# Patient Record
Sex: MALE | Race: WHITE | NOT HISPANIC OR LATINO | Employment: FULL TIME | URBAN - METROPOLITAN AREA
[De-identification: names, ages, dates, MRNs, and addresses within clinical notes are randomized per-mention and may not be internally consistent; named-entity substitution may affect disease eponyms.]

---

## 2021-03-22 ENCOUNTER — IMMUNIZATION (OUTPATIENT)
Dept: HEMATOLOGY/ONCOLOGY | Facility: CLINIC | Age: 41
End: 2021-03-22
Payer: COMMERCIAL

## 2021-03-22 DIAGNOSIS — Z23 NEED FOR VACCINATION: Primary | ICD-10-CM

## 2021-03-22 PROCEDURE — 0001A COVID-19, MRNA, LNP-S, PF, 30 MCG/0.3 ML DOSE VACCINE: ICD-10-PCS | Mod: CV19,S$GLB,, | Performed by: FAMILY MEDICINE

## 2021-03-22 PROCEDURE — 91300 COVID-19, MRNA, LNP-S, PF, 30 MCG/0.3 ML DOSE VACCINE: ICD-10-PCS | Mod: S$GLB,,, | Performed by: FAMILY MEDICINE

## 2021-03-22 PROCEDURE — 91300 COVID-19, MRNA, LNP-S, PF, 30 MCG/0.3 ML DOSE VACCINE: CPT | Mod: S$GLB,,, | Performed by: FAMILY MEDICINE

## 2021-03-22 PROCEDURE — 0001A COVID-19, MRNA, LNP-S, PF, 30 MCG/0.3 ML DOSE VACCINE: CPT | Mod: CV19,S$GLB,, | Performed by: FAMILY MEDICINE

## 2021-04-12 ENCOUNTER — IMMUNIZATION (OUTPATIENT)
Dept: HEMATOLOGY/ONCOLOGY | Facility: CLINIC | Age: 41
End: 2021-04-12
Payer: COMMERCIAL

## 2021-04-12 DIAGNOSIS — Z23 NEED FOR VACCINATION: Primary | ICD-10-CM

## 2021-04-12 PROCEDURE — 91300 COVID-19, MRNA, LNP-S, PF, 30 MCG/0.3 ML DOSE VACCINE: CPT | Mod: S$GLB,,, | Performed by: FAMILY MEDICINE

## 2021-04-12 PROCEDURE — 0002A COVID-19, MRNA, LNP-S, PF, 30 MCG/0.3 ML DOSE VACCINE: CPT | Mod: CV19,S$GLB,, | Performed by: FAMILY MEDICINE

## 2021-04-12 PROCEDURE — 91300 COVID-19, MRNA, LNP-S, PF, 30 MCG/0.3 ML DOSE VACCINE: ICD-10-PCS | Mod: S$GLB,,, | Performed by: FAMILY MEDICINE

## 2021-04-12 PROCEDURE — 0002A COVID-19, MRNA, LNP-S, PF, 30 MCG/0.3 ML DOSE VACCINE: ICD-10-PCS | Mod: CV19,S$GLB,, | Performed by: FAMILY MEDICINE

## 2021-06-29 ENCOUNTER — TELEPHONE (OUTPATIENT)
Dept: UROLOGY | Facility: CLINIC | Age: 41
End: 2021-06-29

## 2021-07-09 ENCOUNTER — OFFICE VISIT (OUTPATIENT)
Dept: UROLOGY | Facility: CLINIC | Age: 41
End: 2021-07-09
Payer: COMMERCIAL

## 2021-07-09 VITALS
RESPIRATION RATE: 18 BRPM | BODY MASS INDEX: 42.66 KG/M2 | WEIGHT: 315 LBS | SYSTOLIC BLOOD PRESSURE: 145 MMHG | HEIGHT: 72 IN | HEART RATE: 78 BPM | DIASTOLIC BLOOD PRESSURE: 84 MMHG

## 2021-07-09 DIAGNOSIS — Z30.2 STERILIZATION: Primary | ICD-10-CM

## 2021-07-09 PROCEDURE — 99203 PR OFFICE/OUTPT VISIT, NEW, LEVL III, 30-44 MIN: ICD-10-PCS | Mod: S$GLB,,, | Performed by: UROLOGY

## 2021-07-09 PROCEDURE — 99203 OFFICE O/P NEW LOW 30 MIN: CPT | Mod: S$GLB,,, | Performed by: UROLOGY

## 2021-07-09 RX ORDER — LOSARTAN POTASSIUM 50 MG/1
TABLET ORAL
COMMUNITY
Start: 2021-07-03 | End: 2023-03-29

## 2021-07-09 RX ORDER — ALLOPURINOL 300 MG/1
TABLET ORAL
COMMUNITY
Start: 2021-07-03 | End: 2023-07-19

## 2021-07-09 RX ORDER — SILDENAFIL 100 MG/1
100 TABLET, FILM COATED ORAL DAILY
COMMUNITY
Start: 2021-06-27 | End: 2023-04-17

## 2021-07-09 RX ORDER — METFORMIN HYDROCHLORIDE 500 MG/1
TABLET, EXTENDED RELEASE ORAL
COMMUNITY
Start: 2021-06-02 | End: 2023-03-29

## 2021-07-09 RX ORDER — HYDROXYZINE HYDROCHLORIDE 25 MG/1
TABLET, FILM COATED ORAL
COMMUNITY
Start: 2021-04-20 | End: 2023-08-08

## 2021-08-31 ENCOUNTER — TELEPHONE (OUTPATIENT)
Dept: UROLOGY | Facility: CLINIC | Age: 41
End: 2021-08-31

## 2021-09-07 ENCOUNTER — PROCEDURE VISIT (OUTPATIENT)
Dept: UROLOGY | Facility: CLINIC | Age: 41
End: 2021-09-07
Payer: COMMERCIAL

## 2021-09-07 VITALS
RESPIRATION RATE: 20 BRPM | OXYGEN SATURATION: 96 % | SYSTOLIC BLOOD PRESSURE: 116 MMHG | DIASTOLIC BLOOD PRESSURE: 73 MMHG | HEART RATE: 86 BPM | HEIGHT: 72 IN | BODY MASS INDEX: 39.85 KG/M2 | WEIGHT: 294.19 LBS

## 2021-09-07 DIAGNOSIS — Z30.2 STERILIZATION: Primary | ICD-10-CM

## 2021-09-07 PROCEDURE — 55250 REMOVAL OF SPERM DUCT(S): CPT | Mod: S$GLB,,, | Performed by: UROLOGY

## 2021-09-07 PROCEDURE — 55250 VASECTOMY: ICD-10-PCS | Mod: S$GLB,,, | Performed by: UROLOGY

## 2021-09-10 ENCOUNTER — TELEPHONE (OUTPATIENT)
Dept: UROLOGY | Facility: CLINIC | Age: 41
End: 2021-09-10

## 2021-09-15 ENCOUNTER — TELEPHONE (OUTPATIENT)
Dept: UROLOGY | Facility: CLINIC | Age: 41
End: 2021-09-15

## 2022-01-21 ENCOUNTER — TELEPHONE (OUTPATIENT)
Dept: UROLOGY | Facility: CLINIC | Age: 42
End: 2022-01-21
Payer: COMMERCIAL

## 2022-01-21 LAB
ABSTINENCE: 20 DAYS (ref 3–7)
Lab: 1026
MICROSCOPIC EXAM: ABNORMAL
RECEIVED WITHIN 1 HOUR: YES
SPERM SEEN: ABNORMAL SPERM/HPF
TIME DELIVERED: 1102
VOLUME: 3.5 ML

## 2022-01-21 NOTE — TELEPHONE ENCOUNTER
Informed pt of results.    ----- Message from Tobi Tapia sent at 1/21/2022  1:29 PM CST -----  Contact: Patient  Patient returning Debbie call       Please call the patient back at    # [phone]

## 2022-01-21 NOTE — TELEPHONE ENCOUNTER
Attempted to contact pt, no answer lvm for callback. BJP    ----- Message from Laly Pinedo NP sent at 1/21/2022 12:11 PM CST -----  Please call patient and let him know that he is now sterile

## 2022-04-09 ENCOUNTER — HISTORICAL (OUTPATIENT)
Dept: ADMINISTRATIVE | Facility: HOSPITAL | Age: 42
End: 2022-04-09

## 2022-04-14 LAB
BASOPHILS # BLD AUTO: 0.1 10*3/UL (ref 0–0.2)
BASOPHILS NFR BLD AUTO: 1 %
CHOLEST SERPL-MCNC: 149 MG/DL (ref 100–199)
CHOLEST/HDLC SERPL: 5.5 {RATIO} (ref 0–5)
DEPRECATED CALCIDIOL+CALCIFEROL SERPL-MC: 30.6 NG/ML (ref 30–100)
EOSINOPHIL # BLD AUTO: 0.3 10*3/UL (ref 0–0.4)
EOSINOPHIL NFR BLD AUTO: 4 %
ERYTHROCYTE [DISTWIDTH] IN BLOOD BY AUTOMATED COUNT: 13.7 % (ref 11.6–15.4)
HBA1C MFR BLD: 5.8 % (ref 4.8–5.6)
HCT VFR BLD AUTO: 47.1 % (ref 37.5–51)
HDLC SERPL-MCNC: 27 MG/DL
HGB BLD-MCNC: 15.9 G/DL (ref 13–17.7)
LDLC SERPL CALC-MCNC: 90 MG/DL (ref 0–99)
LYMPHOCYTES # BLD AUTO: 2.4 10*3/UL (ref 0.7–3.1)
LYMPHOCYTES NFR BLD AUTO: 32 %
MCH RBC QN AUTO: 28.8 PG (ref 26.6–33)
MCHC RBC AUTO-ENTMCNC: 33.8 G/DL (ref 31.5–35.7)
MCV RBC AUTO: 85 FL (ref 79–97)
MONOCYTES # BLD AUTO: 0.7 10*3/UL (ref 0.1–0.9)
MONOCYTES NFR BLD AUTO: 9 %
NEUTROPHILS # BLD AUTO: 4 10*3/UL (ref 1.4–7)
NEUTROPHILS NFR BLD AUTO: 54 %
PLATELET # BLD AUTO: 228 10*3/UL (ref 150–450)
RBC # BLD AUTO: 5.52 10*6/UL (ref 4.14–5.8)
TRIGL SERPL-MCNC: 187 MG/DL (ref 0–149)
TSH SERPL-ACNC: 1.6 M[IU]/L (ref 0.45–4.5)
URATE SERPL-MCNC: 5.4 MG/DL (ref 3.8–8.4)
VLDLC SERPL CALC-MCNC: 32 MG/DL (ref 5–40)
WBC # SPEC AUTO: 7.6 10*3/UL (ref 3.4–10.8)

## 2022-04-25 VITALS
BODY MASS INDEX: 39.36 KG/M2 | HEIGHT: 73 IN | DIASTOLIC BLOOD PRESSURE: 72 MMHG | OXYGEN SATURATION: 98 % | WEIGHT: 297 LBS | SYSTOLIC BLOOD PRESSURE: 120 MMHG

## 2022-05-16 ENCOUNTER — HISTORICAL (OUTPATIENT)
Dept: ADMINISTRATIVE | Facility: HOSPITAL | Age: 42
End: 2022-05-16

## 2022-05-21 NOTE — HISTORICAL [facility]ER
This is a historical note converted from [employer]. Formatting and pictures may have been removed.  Please reference [employer] for original formatting and attached multimedia. Chief Complaint  6 month f/u w/labs. C/O mid back pain x2 mon. w/no known injury.  History of Present Illness  The patient presents for follow-up with fasting labs and his blood pressure has been much better as long as he gets his medicines and takes it.? He has not had another gouty attack to need the colchicine but is taking the allopurinol regularly.? He is still tired on a regular basis has had sleep study?approximately 3 to 4 years ago?but was not able to afford the CPAP but now he has new insurance and would like to try?because he does have excessive sleep?during the day.? He is taking his cholesterol medication he does take Metformin?but he still finding that he is tired and feeling his medication is helping otherwise.  Review of Systems  Constitutional:Negative except HPI  Eye:No recent visual problems, no blurry vision, no visual disturbances.  ENT:No ear pain, no change in hearing, no nasal congestion, no sore throat  Respiratory:No shortness of breath, no cough, no wheezing.  Cardiovascular:No chest pain, no palpitations, no peripheral edema.  Gastrointestinal:No nausea, no vomiting, no diarrhea, no constipation, no abdominal pain.  Genitourinary:No dysuria, no hematuria.?_?  Immunologic:No recurrent fevers, no malaise  Hematology/lymphatics:No swollen lymph glands.  Musculoskeletal:Negative except HPI.  Integumentary:No rashes, no skin lesions.  Neurologic:Alert, oriented ?4, no abnormal balance, no confusion, no numbness, no tingling, no headache  Psychiatric:Negative except HPI.?  Physical Exam  Vitals & Measurements  T:?36.4? ?C (Temporal Artery)? HR:?70(Peripheral)? RR:?18? BP:?122/64? SpO2:?99%?  HT:?185.45?cm? WT:?140.910?kg? BMI:?40.97?  General:?Alert and oriented, no acute distress.  Eye:?Pupils are equal, round?and reactive to  light, extraocular movements are intact  HENT:?Normocephalic,?TMs clear, moist oral mucosa, no pharyngeal erythema, no sinus tenderness.??  Neck: Supple, nontender, no lymphadenopathy, no thyromegaly??  Respiratory:?Lungs are clear to auscultation, breath sounds are equal and symmetric  Cardiovascular:?Normal rate, regular rhythm, no murmurs, no gallop, no edema  Gastrointestinal:?Soft, nontender, nondistended, normal bowel sounds, no organomegaly.  Genitourinary: No CVA tenderness??  Musculoskeletal:Normal range of motion, no swelling,?normal gait.?  Integumentary:?Warm, dry, pink, no rash.?  Neurologic:?Alert, oriented, no focal deficits,?normal DTRs??  Psychiatric:?Cooperative, appropriate mood and affect, normal judgment,?nonsuicidal??  Assessment/Plan  1.?Hyperlipidemia ? E78.5  2.?Benign hypertension ? I10  3.?BMI 40.0-44.9, adult ? Z68.41  4.?History of gout ? Z87.39  5.?Nicotine dependence ? F17.200  6.?Prediabetes ? R73.03  7.?Sleep apnea ? G47.30  Orders:  ezetimibe, See Instructions, TAKE 1 TABLET BY MOUTH EVERY DAY IN THE EVENING, # 90 tab(s), 1 Refill(s), [id number], 185.45, cm, Height/Length Dosing, 04/13/22 8:25:00 CDT, 140.91, kg, Weight Dosing, 04/13/22 8:25:00 CDT  losartan, See Instructions, TAKE 1 TABLET BY MOUTH EVERY DAY, # 30 tab(s), 2 Refill(s), Pharmacy: [facility] #[id number], 185.45, cm, Height/Length Dosing, 04/13/22 8:25:00 CDT, 140.91, kg, Weight Dosing, 04/13/22 8:25:00 CDT  metFORMIN, See Instructions, TAKE 2 TABLETS BY MOUTH IN THE EVENING, # 60 tab(s), 2 Refill(s), [id number], 185.45, cm, Height/Length Dosing, 04/13/22 8:25:00 CDT, 140.91, kg, Weight Dosing, 04/13/22 8:25:00 CDT  semaglutide, 0.25 mg =, Subcutaneous, qWeek, rotate injection sites begin with 0.25 mg and increase to 0.5 for blood sugar control, # 1 EA, 2 Refill(s), [facility], 185.45, cm, Height/Length Dosing, 04/13/22 8:25:00 CDT, 140.91, kg,  James...  tiZANidine, See Instructions, TAKE 1 TABLET BY MOUTH EVERY NIGHT AT BEDTIME, # 21 tab(s), 0 Refill(s), Pharmacy: [facility]/pharmacy #[id number], 185.45, cm, Height/Length Dosing, 04/13/22 8:25:00 CDT, 140.91, kg, Weight Dosing, 04/13/22 8:25:00 CDT  CMP, FLP, Uric acid, Hgb a1c,  Referrals  Clinic Follow up, *Est. 10/13/22 3:00:00 CDT, Order for future visit, Hyperlipidemia  Benign hypertension  BMI 40.0-44.9, adult  History of gout  Nicotine dependence  Prediabetes  Sleep apnea, [facility]  Clinic Follow up, *Est. 05/25/22 3:00:00 CDT, Order for future visit, Hyperlipidemia  Benign hypertension  BMI 40.0-44.9, adult  History of gout  Nicotine dependence  Prediabetes  Sleep apnea, [facility]   Problem List/Past Medical History  Ongoing  Benign hypertension  BMI 40.0-44.9, adult  History of gout  Hyperlipidemia  Nicotine dependence  Prediabetes  Sleep apnea  Historical  No qualifying data  Procedure/Surgical History  vasectomy (09/07/2021)  knee arthroscopy/surgery   Medications  allopurinol 300 mg oral tablet, See Instructions  colchicine 0.6 mg oral tablet, 0.6 mg= 1 tab(s), Oral, q1hr, PRN  ezetimibe 10 mg oral tablet, See Instructions, 1 refills  losartan 50 mg oral tablet, See Instructions, 2 refills  MetFORMIN (Eqv-Glucophage XR) 500 mg oral tablet, extended release, See Instructions, 2 refills  Ozempic 2 mg/1.5 mL (0.25 mg or 0.5 mg dose) subcutaneous solution, 0.25 mg, Subcutaneous, qWeek, 2 refills  sildenafil 100 mg oral tablet, 100 mg= 1 tab(s), Oral, Daily, 1 refills  tiZANidine 4 mg oral tablet, See Instructions  Allergies  No Known Allergies  No Known Medication Allergies  Social History  Abuse/Neglect  No, No, Yes, 04/13/2022  Alcohol  Past, 03/18/2020  Employment/School  Employed, 09/09/2021  Home/Environment  Living situation: Home/Independent., 09/09/2021  Nutrition/Health  Regular, Good, 09/09/2021  Substance Use  Never,  03/18/2020  Tobacco  Former smoker, quit more than 30 days ago, Oral, N/A, 04/13/2022  Family History  Family history is negative  Immunizations  Vaccine Date Status Comments   COVID-19 MRNA, LNP-S, PF- Pfizer 04/12/2021 Recorded 2021-06-16: VIS DATE: 12/12/2020   COVID-19 MRNA, LNP-S, PF- Pfizer 03/22/2021 Recorded 2021-06-16: VIS DATE: 12/12/2020   influenza virus vaccine, inactivated 01/21/2021 Given    tetanus/diphtheria/pertussis, acel(Tdap) 09/19/2018 Recorded    influenza virus vaccine, inactivated 09/19/2018 Recorded    Health Maintenance  Health Maintenance  ???Pending?(in the next year)  ???There are no current recommendations pending  ??? ??Due In Future?  ??? ? ? ?Diabetes Screening not due until??09/02/22??and every 1??year(s)  ??? ? ? ?Hypertension Management-BMP not due until??09/02/22??and every 1??year(s)  ??? ? ? ?ADL Screening not due until??09/09/22??and every 1??year(s)  ??? ? ? ?Obesity Screening not due until??01/01/23??and every 1??year(s)  ??? ? ? ?Alcohol Misuse Screening not due until??01/02/23??and every 1??year(s)  ???Satisfied?(in the past 1 year)  ??? ??Satisfied?  ??? ? ? ?ADL Screening on??09/09/21.??Satisfied by Huong Nevarez LPN  ??? ? ? ?Alcohol Misuse Screening on??04/13/22.??Satisfied by Huong Nevarez LPN  ??? ? ? ?Blood Pressure Screening on??04/13/22.??Satisfied by Huong Nevarez LPN  ??? ? ? ?Body Mass Index Check on??04/13/22.??Satisfied by Huong Nevarez LPN  ??? ? ? ?Depression Screening on??04/13/22.??Satisfied by Huong Nevarez LPN  ??? ? ? ?Diabetes Maintenance-HgbA1c on??09/02/21.??Satisfied by [facility], [facility]  ??? ? ? ?Diabetes Screening on??09/02/21.??Satisfied by [facility]ributor[facility]system, [facility]  ??? ? ? ?Hypertension Management-Blood Pressure on??04/13/22.??Satisfied by Huong Nevarez LPN  ??? ? ? ?Lipid Screening on??09/02/21.??Satisfied by [facility], [facility]  ??? ? ? ?Obesity Screening  on??04/13/22.??Satisfied by Huong Nevarez LPN  ?

## 2022-08-02 LAB — HBA1C MFR BLD: 5.5 % (ref 4–6)

## 2023-02-01 ENCOUNTER — TELEPHONE (OUTPATIENT)
Dept: FAMILY MEDICINE | Facility: CLINIC | Age: 43
End: 2023-02-01
Payer: COMMERCIAL

## 2023-02-01 DIAGNOSIS — M62.838 MUSCLE SPASM: Primary | ICD-10-CM

## 2023-02-01 RX ORDER — TIZANIDINE 4 MG/1
1 TABLET ORAL NIGHTLY PRN
Refills: 1 | COMMUNITY
Start: 2023-01-04 | End: 2023-02-01 | Stop reason: SDUPTHER

## 2023-02-01 RX ORDER — TIZANIDINE 4 MG/1
4 TABLET ORAL NIGHTLY PRN
Qty: 21 TABLET | Refills: 1 | Status: SHIPPED | OUTPATIENT
Start: 2023-02-01 | End: 2023-03-20

## 2023-02-01 NOTE — TELEPHONE ENCOUNTER
----- Message from Shellie Metz sent at 1/31/2023  1:18 PM CST -----  Patient needs refill on Tizanidine 4 mg [facility] on [address]

## 2023-02-15 PROBLEM — F17.200 NICOTINE DEPENDENCE: Status: ACTIVE | Noted: 2023-02-15

## 2023-02-15 PROBLEM — R73.03 PREDIABETES: Status: ACTIVE | Noted: 2023-02-15

## 2023-02-15 PROBLEM — I10 BENIGN HYPERTENSION: Status: ACTIVE | Noted: 2023-02-15

## 2023-02-15 PROBLEM — G47.30 SLEEP APNEA: Status: ACTIVE | Noted: 2023-02-15

## 2023-02-15 PROBLEM — E78.5 HYPERLIPIDEMIA: Status: ACTIVE | Noted: 2023-02-15

## 2023-02-15 PROBLEM — Z87.39 HISTORY OF GOUT: Status: ACTIVE | Noted: 2023-02-15

## 2023-04-27 DIAGNOSIS — M62.838 MUSCLE SPASM: ICD-10-CM

## 2023-04-27 RX ORDER — TIZANIDINE 4 MG/1
TABLET ORAL
Qty: 21 TABLET | Refills: 0 | Status: SHIPPED | OUTPATIENT
Start: 2023-04-27 | End: 2023-05-10

## 2023-05-10 DIAGNOSIS — M62.838 MUSCLE SPASM: ICD-10-CM

## 2023-05-10 RX ORDER — TIZANIDINE 4 MG/1
TABLET ORAL
Qty: 21 TABLET | Refills: 0 | Status: SHIPPED | OUTPATIENT
Start: 2023-05-10 | End: 2023-06-01

## 2023-06-20 DIAGNOSIS — I10 HYPERTENSION, UNSPECIFIED TYPE: ICD-10-CM

## 2023-06-20 DIAGNOSIS — M62.838 MUSCLE SPASM: ICD-10-CM

## 2023-06-20 DIAGNOSIS — E11.9 TYPE 2 DIABETES MELLITUS WITHOUT COMPLICATION, UNSPECIFIED WHETHER LONG TERM INSULIN USE: ICD-10-CM

## 2023-06-20 DIAGNOSIS — E78.5 HYPERLIPIDEMIA, UNSPECIFIED HYPERLIPIDEMIA TYPE: Primary | ICD-10-CM

## 2023-06-20 RX ORDER — LOSARTAN POTASSIUM 50 MG/1
TABLET ORAL
Qty: 30 TABLET | Refills: 2 | Status: SHIPPED | OUTPATIENT
Start: 2023-06-20 | End: 2023-08-08 | Stop reason: SDUPTHER

## 2023-06-20 RX ORDER — EZETIMIBE 10 MG/1
TABLET ORAL
Qty: 30 TABLET | Refills: 5 | Status: SHIPPED | OUTPATIENT
Start: 2023-06-20 | End: 2023-08-08 | Stop reason: SDUPTHER

## 2023-06-20 RX ORDER — METFORMIN HYDROCHLORIDE 500 MG/1
TABLET, EXTENDED RELEASE ORAL
Qty: 60 TABLET | Refills: 2 | Status: SHIPPED | OUTPATIENT
Start: 2023-06-20 | End: 2023-08-08 | Stop reason: SDUPTHER

## 2023-06-20 RX ORDER — TIZANIDINE 4 MG/1
TABLET ORAL
Qty: 21 TABLET | Refills: 0 | Status: SHIPPED | OUTPATIENT
Start: 2023-06-20 | End: 2023-07-10 | Stop reason: SDUPTHER

## 2023-07-06 DIAGNOSIS — R73.03 PREDIABETES: Primary | ICD-10-CM

## 2023-07-10 DIAGNOSIS — M62.838 MUSCLE SPASM: ICD-10-CM

## 2023-07-10 RX ORDER — SEMAGLUTIDE 1.34 MG/ML
INJECTION, SOLUTION SUBCUTANEOUS
Qty: 3 EACH | Refills: 3 | Status: SHIPPED | OUTPATIENT
Start: 2023-07-10 | End: 2023-08-08

## 2023-07-11 RX ORDER — TIZANIDINE 4 MG/1
4 TABLET ORAL DAILY PRN
Qty: 21 TABLET | Refills: 0 | Status: SHIPPED | OUTPATIENT
Start: 2023-07-11 | End: 2023-07-31

## 2023-07-19 DIAGNOSIS — Z87.39 HISTORY OF GOUT: Primary | ICD-10-CM

## 2023-07-19 RX ORDER — ALLOPURINOL 300 MG/1
TABLET ORAL
Qty: 30 TABLET | Refills: 5 | Status: SHIPPED | OUTPATIENT
Start: 2023-07-19 | End: 2024-01-22

## 2023-07-29 DIAGNOSIS — M62.838 MUSCLE SPASM: ICD-10-CM

## 2023-07-31 RX ORDER — TIZANIDINE 4 MG/1
TABLET ORAL
Qty: 21 TABLET | Refills: 0 | Status: SHIPPED | OUTPATIENT
Start: 2023-07-31 | End: 2023-08-08 | Stop reason: SDUPTHER

## 2023-08-02 ENCOUNTER — DOCUMENTATION ONLY (OUTPATIENT)
Dept: FAMILY MEDICINE | Facility: CLINIC | Age: 43
End: 2023-08-02
Payer: COMMERCIAL

## 2023-08-08 ENCOUNTER — OFFICE VISIT (OUTPATIENT)
Dept: FAMILY MEDICINE | Facility: CLINIC | Age: 43
End: 2023-08-08
Payer: COMMERCIAL

## 2023-08-08 VITALS
DIASTOLIC BLOOD PRESSURE: 78 MMHG | WEIGHT: 294 LBS | TEMPERATURE: 97 F | RESPIRATION RATE: 18 BRPM | SYSTOLIC BLOOD PRESSURE: 132 MMHG | BODY MASS INDEX: 38.97 KG/M2 | HEIGHT: 73 IN | OXYGEN SATURATION: 100 % | HEART RATE: 73 BPM

## 2023-08-08 DIAGNOSIS — Z87.39 HISTORY OF GOUT: ICD-10-CM

## 2023-08-08 DIAGNOSIS — I10 HYPERTENSION, UNSPECIFIED TYPE: ICD-10-CM

## 2023-08-08 DIAGNOSIS — E11.9 TYPE 2 DIABETES MELLITUS WITHOUT COMPLICATION, WITHOUT LONG-TERM CURRENT USE OF INSULIN: ICD-10-CM

## 2023-08-08 DIAGNOSIS — E11.9 TYPE 2 DIABETES MELLITUS WITHOUT COMPLICATION, UNSPECIFIED WHETHER LONG TERM INSULIN USE: ICD-10-CM

## 2023-08-08 DIAGNOSIS — G47.30 SLEEP APNEA, UNSPECIFIED TYPE: ICD-10-CM

## 2023-08-08 DIAGNOSIS — N52.9 ERECTILE DYSFUNCTION, UNSPECIFIED ERECTILE DYSFUNCTION TYPE: ICD-10-CM

## 2023-08-08 DIAGNOSIS — M62.838 MUSCLE SPASM: ICD-10-CM

## 2023-08-08 DIAGNOSIS — I10 BENIGN HYPERTENSION: Primary | ICD-10-CM

## 2023-08-08 DIAGNOSIS — E78.5 HYPERLIPIDEMIA, UNSPECIFIED HYPERLIPIDEMIA TYPE: ICD-10-CM

## 2023-08-08 PROCEDURE — 99214 OFFICE O/P EST MOD 30 MIN: CPT | Mod: ,,, | Performed by: NURSE PRACTITIONER

## 2023-08-08 PROCEDURE — 4010F ACE/ARB THERAPY RXD/TAKEN: CPT | Mod: CPTII,,, | Performed by: NURSE PRACTITIONER

## 2023-08-08 PROCEDURE — 3008F BODY MASS INDEX DOCD: CPT | Mod: CPTII,,, | Performed by: NURSE PRACTITIONER

## 2023-08-08 PROCEDURE — 1159F PR MEDICATION LIST DOCUMENTED IN MEDICAL RECORD: ICD-10-PCS | Mod: CPTII,,, | Performed by: NURSE PRACTITIONER

## 2023-08-08 PROCEDURE — 3075F SYST BP GE 130 - 139MM HG: CPT | Mod: CPTII,,, | Performed by: NURSE PRACTITIONER

## 2023-08-08 PROCEDURE — 3078F PR MOST RECENT DIASTOLIC BLOOD PRESSURE < 80 MM HG: ICD-10-PCS | Mod: CPTII,,, | Performed by: NURSE PRACTITIONER

## 2023-08-08 PROCEDURE — 4010F PR ACE/ARB THEARPY RXD/TAKEN: ICD-10-PCS | Mod: CPTII,,, | Performed by: NURSE PRACTITIONER

## 2023-08-08 PROCEDURE — 3078F DIAST BP <80 MM HG: CPT | Mod: CPTII,,, | Performed by: NURSE PRACTITIONER

## 2023-08-08 PROCEDURE — 1160F RVW MEDS BY RX/DR IN RCRD: CPT | Mod: CPTII,,, | Performed by: NURSE PRACTITIONER

## 2023-08-08 PROCEDURE — 3075F PR MOST RECENT SYSTOLIC BLOOD PRESS GE 130-139MM HG: ICD-10-PCS | Mod: CPTII,,, | Performed by: NURSE PRACTITIONER

## 2023-08-08 PROCEDURE — 1159F MED LIST DOCD IN RCRD: CPT | Mod: CPTII,,, | Performed by: NURSE PRACTITIONER

## 2023-08-08 PROCEDURE — 1160F PR REVIEW ALL MEDS BY PRESCRIBER/CLIN PHARMACIST DOCUMENTED: ICD-10-PCS | Mod: CPTII,,, | Performed by: NURSE PRACTITIONER

## 2023-08-08 PROCEDURE — 99214 PR OFFICE/OUTPT VISIT, EST, LEVL IV, 30-39 MIN: ICD-10-PCS | Mod: ,,, | Performed by: NURSE PRACTITIONER

## 2023-08-08 PROCEDURE — 3008F PR BODY MASS INDEX (BMI) DOCUMENTED: ICD-10-PCS | Mod: CPTII,,, | Performed by: NURSE PRACTITIONER

## 2023-08-08 RX ORDER — EZETIMIBE 10 MG/1
10 TABLET ORAL NIGHTLY
Qty: 90 TABLET | Refills: 0 | Status: SHIPPED | OUTPATIENT
Start: 2023-08-08 | End: 2023-11-13

## 2023-08-08 RX ORDER — SEMAGLUTIDE 2.68 MG/ML
2 INJECTION, SOLUTION SUBCUTANEOUS
Qty: 3 ML | Refills: 2 | Status: SHIPPED | OUTPATIENT
Start: 2023-08-08 | End: 2023-11-06

## 2023-08-08 RX ORDER — SILDENAFIL 100 MG/1
100 TABLET, FILM COATED ORAL
Qty: 90 TABLET | Refills: 0 | Status: SHIPPED | OUTPATIENT
Start: 2023-08-08 | End: 2023-09-26 | Stop reason: ALTCHOICE

## 2023-08-08 RX ORDER — TIZANIDINE 4 MG/1
4 TABLET ORAL DAILY
Qty: 30 TABLET | Refills: 0 | Status: SHIPPED | OUTPATIENT
Start: 2023-08-08 | End: 2023-08-17

## 2023-08-08 RX ORDER — LOSARTAN POTASSIUM 50 MG/1
50 TABLET ORAL DAILY
Qty: 90 TABLET | Refills: 0 | Status: SHIPPED | OUTPATIENT
Start: 2023-08-08 | End: 2023-11-13

## 2023-08-08 RX ORDER — METFORMIN HYDROCHLORIDE 500 MG/1
1000 TABLET, EXTENDED RELEASE ORAL 2 TIMES DAILY WITH MEALS
Qty: 360 TABLET | Refills: 0 | Status: SHIPPED | OUTPATIENT
Start: 2023-08-08 | End: 2023-10-09

## 2023-08-08 NOTE — ASSESSMENT & PLAN NOTE
The 10-year ASCVD risk score (Aroldo TEAGUE, et al., 2019) is: 2.6%*    Values used to calculate the score:      Age: 43 years      Sex: Male      Is Non- : No      Diabetic: No      Tobacco smoker: No      Systolic Blood Pressure: 132 mmHg      Is BP treated: Yes      HDL Cholesterol: 27 mg/dL*      Total Cholesterol: 149 mg/dL*      * - Cholesterol units were assumed for this score calculation

## 2023-08-08 NOTE — PROGRESS NOTES
Subjective:       Patient ID: Jose Valadez is a 43 y.o. male.    Chief Complaint: medication f/u (Would like to increase ozempic dose)    He is here today for follow up chronic conditions. Tolerating ozempic without nausea, vomiting or GI upset. He is down 3 pounds from previous visit. He is unsure when diabetes diagnosis but a1c stable for past couple of year. He denies home blood sugar monitoring. He denies home blood pressure readings. He lillian chest pain, shortness of breath, or any other cardiac issues. He did have sleep study done couple of years that reveal sleep apnea but machine too expensive. He would like to either try to reorder machine or retest. He does snore at night. He is with day time fatigue especially worsen in past couple of months. He is with constipation and diarrhea that alternates. He did have some abnormal stool that he describes looking sphagetti sauce. He states 3 or 4 times. He denies rectal pain. He winchester shave occasional llq and rlq pain. He denies any fever or any other issues. He is unsure if this is a change in bowel patterns. He denies colonoscopy at this time. He would like to to have testosterone checked due for fatigue and decrease labido. He does finds at time urine stream strong and some times no so strong. He does find when urine stream with difficulty starting also difficulty getting erections. He denies prostate exam at this time but will consider urologist if testosterone and PSA wnl.       Review of Systems   Constitutional:  Positive for fatigue. Negative for activity change, appetite change, chills and fever.   HENT:  Negative for ear pain, sinus pressure/congestion, sore throat, tinnitus and trouble swallowing.    Eyes:  Negative for pain, discharge and eye dryness.   Respiratory:  Negative for apnea, cough, chest tightness and shortness of breath.    Cardiovascular:  Negative for chest pain.   Gastrointestinal:  Positive for abdominal pain, constipation and diarrhea.  Negative for abdominal distention.   Endocrine: Negative for cold intolerance and heat intolerance.   Genitourinary:  Positive for difficulty urinating and erectile dysfunction. Negative for bladder incontinence.   Musculoskeletal:  Negative for arthralgias, back pain and gait problem.   Integumentary:  Negative.   Allergic/Immunologic: Negative for environmental allergies, food allergies and frequent infections.   Neurological:  Negative for dizziness, headaches, coordination difficulties, memory loss and coordination difficulties.   Psychiatric/Behavioral:  Negative for sleep disturbance and suicidal ideas. The patient is not nervous/anxious.          Objective:      Physical Exam  Vitals and nursing note reviewed.   Constitutional:       Appearance: Normal appearance. He is obese.   HENT:      Head: Normocephalic and atraumatic.      Right Ear: Tympanic membrane, ear canal and external ear normal.      Left Ear: Tympanic membrane, ear canal and external ear normal.      Nose: Nose normal.      Mouth/Throat:      Mouth: Mucous membranes are moist.      Pharynx: Oropharynx is clear.   Eyes:      Extraocular Movements: Extraocular movements intact.      Conjunctiva/sclera: Conjunctivae normal.      Pupils: Pupils are equal, round, and reactive to light.   Cardiovascular:      Rate and Rhythm: Normal rate and regular rhythm.      Pulses: Normal pulses.      Heart sounds: Normal heart sounds.   Pulmonary:      Effort: Pulmonary effort is normal.      Breath sounds: Normal breath sounds.   Abdominal:      General: Abdomen is flat. Bowel sounds are normal.      Palpations: Abdomen is soft.      Comments: No significant tenderness to abdomen   Musculoskeletal:         General: Normal range of motion.      Cervical back: Normal range of motion.   Skin:     General: Skin is warm and dry.      Capillary Refill: Capillary refill takes less than 2 seconds.   Neurological:      General: No focal deficit present.      Mental  Status: He is alert and oriented to person, place, and time.   Psychiatric:         Attention and Perception: Attention and perception normal.         Mood and Affect: Mood and affect normal.         Speech: Speech normal.         Behavior: Behavior normal. Behavior is cooperative.         Thought Content: Thought content normal.         Cognition and Memory: Cognition normal.         Assessment/Plan:     Vitals:    08/08/23 0841   BP: 132/78   Pulse: 73   Resp: 18   Temp: 96.8 °F (36 °C)        1. Benign hypertension  Assessment & Plan:  The current medical regimen is effective;  continue present plan and medications.  Denies home blood pressure readings       2. Type 2 diabetes mellitus without complication, without long-term current use of insulin  Assessment & Plan:  Increase ozempic to 2mg     Orders:  -     semaglutide (OZEMPIC) 2 mg/dose (8 mg/3 mL) PnIj; Inject 2 mg into the skin every 7 days.  Dispense: 3 mL; Refill: 2    3. Hyperlipidemia, unspecified hyperlipidemia type  Assessment & Plan:  The 10-year ASCVD risk score (Aroldo TEAGUE, et al., 2019) is: 2.6%*    Values used to calculate the score:      Age: 43 years      Sex: Male      Is Non- : No      Diabetic: No      Tobacco smoker: No      Systolic Blood Pressure: 132 mmHg      Is BP treated: Yes      HDL Cholesterol: 27 mg/dL*      Total Cholesterol: 149 mg/dL*      * - Cholesterol units were assumed for this score calculation      Orders:  -     ezetimibe (ZETIA) 10 mg tablet; Take 1 tablet (10 mg total) by mouth every evening.  Dispense: 90 tablet; Refill: 0    4. History of gout  Assessment & Plan:  No current episodes       5. Sleep apnea, unspecified type  Assessment & Plan:  Last sleep study about 2 years but cpap machine too expensive   Reorder sleep study       6. Erectile dysfunction, unspecified erectile dysfunction type  -     sildenafiL (VIAGRA) 100 MG tablet; Take 1 tablet (100 mg total) by mouth as needed for  Erectile Dysfunction.  Dispense: 90 tablet; Refill: 0    7. Hypertension, unspecified type  -     losartan (COZAAR) 50 MG tablet; Take 1 tablet (50 mg total) by mouth once daily.  Dispense: 90 tablet; Refill: 0    8. Type 2 diabetes mellitus without complication, unspecified whether long term insulin use  Assessment & Plan:  Increase ozempic to 2mg     Orders:  -     metFORMIN (GLUCOPHAGE-XR) 500 MG ER 24hr tablet; Take 2 tablets (1,000 mg total) by mouth 2 (two) times daily with meals.  Dispense: 360 tablet; Refill: 0    9. Muscle spasm  -     tiZANidine (ZANAFLEX) 4 MG tablet; Take 1 tablet (4 mg total) by mouth once daily.  Dispense: 30 tablet; Refill: 0     Educated on home blood pressure reading and home glucose monitoring. Send order for cpap machine with new insurance or reorder sleep study if needed. Consider GI referral if bowel patterns continue or any sign of blood to notify. Diet modifications, low fat, low carb, heart healthy, diet.       Follow up in about 6 months (around 2/8/2024) for Clinic Follow Up.   Discussed the diagnosis, prognosis, plan of care, chronic nature of and indications for, risks and benefits of treatment for conditions.  Continue all medications as prescribed unless otherwise noted.   Call if patient develops other symptoms or if not better in 2-3 days and sooner if worse. Emphasized the importance of compliance with all recommendations, including medication use and timely follow up. Instructed to return as noted be or sooner if patient develops any other problems or if there are any other additional questions or concerns.

## 2023-08-08 NOTE — ASSESSMENT & PLAN NOTE
The current medical regimen is effective;  continue present plan and medications.  Denies home blood pressure readings

## 2023-08-09 ENCOUNTER — TELEPHONE (OUTPATIENT)
Dept: FAMILY MEDICINE | Facility: CLINIC | Age: 43
End: 2023-08-09
Payer: COMMERCIAL

## 2023-08-09 NOTE — TELEPHONE ENCOUNTER
----- Message from Shellie Metz sent at 8/9/2023 12:58 PM CDT -----  Patient saw his labs on [facility] and wants to speak with someone about them

## 2023-08-09 NOTE — TELEPHONE ENCOUNTER
----- Message from Gladys Chavez DNP sent at 8/9/2023 12:35 PM CDT -----  Notify slightly elevated alkaline phos, but other liver enzymes or normal.  LDL currently at 81 goal for diabetes is less than 70 but this is a great improvement from where it was.  Continue efforts to eat healthy increase.  PSA normal uric acid therapeutic.  Hemoglobin A1c excellent continue current medications recheck CMP FLP uric acid cbc,hgb a1c in 6 months, normal testosterone.    Statement Selected

## 2023-08-17 DIAGNOSIS — M62.838 MUSCLE SPASM: ICD-10-CM

## 2023-08-17 RX ORDER — TIZANIDINE 4 MG/1
4 TABLET ORAL
Qty: 21 TABLET | Refills: 0 | Status: SHIPPED | OUTPATIENT
Start: 2023-08-17 | End: 2023-10-25 | Stop reason: SDUPTHER

## 2023-09-20 ENCOUNTER — TELEPHONE (OUTPATIENT)
Dept: FAMILY MEDICINE | Facility: CLINIC | Age: 43
End: 2023-09-20
Payer: COMMERCIAL

## 2023-09-20 DIAGNOSIS — N52.9 ERECTILE DYSFUNCTION, UNSPECIFIED ERECTILE DYSFUNCTION TYPE: Primary | ICD-10-CM

## 2023-09-20 RX ORDER — TADALAFIL 5 MG/1
5 TABLET ORAL DAILY
Qty: 30 TABLET | Refills: 0 | Status: SHIPPED | OUTPATIENT
Start: 2023-09-20 | End: 2023-10-20

## 2023-09-20 NOTE — TELEPHONE ENCOUNTER
----- Message from Shellie Metz sent at 9/20/2023  1:32 PM CDT -----  Patient called to speak with Gladys or her nurse.  He wants to try a new medication similar to the one he currently takes.  Note:  Patient would not give more details on medication.  Please call patient at [phone]

## 2023-09-26 ENCOUNTER — TELEPHONE (OUTPATIENT)
Dept: FAMILY MEDICINE | Facility: CLINIC | Age: 43
End: 2023-09-26
Payer: COMMERCIAL

## 2023-09-26 DIAGNOSIS — N52.9 ERECTILE DYSFUNCTION, UNSPECIFIED ERECTILE DYSFUNCTION TYPE: Primary | ICD-10-CM

## 2023-09-26 RX ORDER — TADALAFIL 10 MG/1
10 TABLET ORAL DAILY PRN
COMMUNITY
End: 2023-09-26 | Stop reason: SDUPTHER

## 2023-09-26 RX ORDER — TADALAFIL 10 MG/1
10 TABLET ORAL DAILY PRN
Qty: 30 TABLET | Refills: 0 | Status: SHIPPED | OUTPATIENT
Start: 2023-09-26 | End: 2023-11-27 | Stop reason: SDUPTHER

## 2023-09-26 NOTE — TELEPHONE ENCOUNTER
[facility] pharmacy called-unable to get cialis 5mg, wanting to know if they could fill the cialis 10mg daily?

## 2023-10-08 DIAGNOSIS — E11.9 TYPE 2 DIABETES MELLITUS WITHOUT COMPLICATION, UNSPECIFIED WHETHER LONG TERM INSULIN USE: ICD-10-CM

## 2023-10-09 RX ORDER — METFORMIN HYDROCHLORIDE 500 MG/1
1000 TABLET, EXTENDED RELEASE ORAL
Qty: 60 TABLET | Refills: 2 | Status: SHIPPED | OUTPATIENT
Start: 2023-10-09 | End: 2023-11-22

## 2023-10-25 ENCOUNTER — TELEPHONE (OUTPATIENT)
Dept: FAMILY MEDICINE | Facility: CLINIC | Age: 43
End: 2023-10-25
Payer: COMMERCIAL

## 2023-10-25 DIAGNOSIS — M62.838 MUSCLE SPASM: ICD-10-CM

## 2023-10-25 RX ORDER — TIZANIDINE 4 MG/1
4 TABLET ORAL DAILY PRN
Qty: 21 TABLET | Refills: 0 | Status: SHIPPED | OUTPATIENT
Start: 2023-10-25 | End: 2023-11-27 | Stop reason: SDUPTHER

## 2023-10-25 NOTE — TELEPHONE ENCOUNTER
----- Message from Clementina Schumacher sent at 10/25/2023  2:43 PM CDT -----  Regarding: Med refill  He needs a refill on his Tizanidine  4mg    [facility] [address] [facility] [place]

## 2023-11-12 DIAGNOSIS — I10 HYPERTENSION, UNSPECIFIED TYPE: ICD-10-CM

## 2023-11-12 DIAGNOSIS — E78.5 HYPERLIPIDEMIA, UNSPECIFIED HYPERLIPIDEMIA TYPE: ICD-10-CM

## 2023-11-13 RX ORDER — LOSARTAN POTASSIUM 50 MG/1
50 TABLET ORAL
Qty: 30 TABLET | Refills: 2 | Status: SHIPPED | OUTPATIENT
Start: 2023-11-13 | End: 2024-02-14

## 2023-11-13 RX ORDER — EZETIMIBE 10 MG/1
10 TABLET ORAL NIGHTLY
Qty: 30 TABLET | Refills: 2 | Status: SHIPPED | OUTPATIENT
Start: 2023-11-13 | End: 2024-02-14

## 2023-11-22 DIAGNOSIS — E11.9 TYPE 2 DIABETES MELLITUS WITHOUT COMPLICATION, UNSPECIFIED WHETHER LONG TERM INSULIN USE: ICD-10-CM

## 2023-11-22 RX ORDER — METFORMIN HYDROCHLORIDE 500 MG/1
1000 TABLET, EXTENDED RELEASE ORAL 2 TIMES DAILY WITH MEALS
Qty: 120 TABLET | Refills: 2 | Status: SHIPPED | OUTPATIENT
Start: 2023-11-22

## 2023-11-27 ENCOUNTER — TELEPHONE (OUTPATIENT)
Dept: FAMILY MEDICINE | Facility: CLINIC | Age: 43
End: 2023-11-27
Payer: COMMERCIAL

## 2023-11-27 DIAGNOSIS — E11.9 TYPE 2 DIABETES MELLITUS WITHOUT COMPLICATION, WITHOUT LONG-TERM CURRENT USE OF INSULIN: Primary | ICD-10-CM

## 2023-11-27 DIAGNOSIS — M62.838 MUSCLE SPASM: ICD-10-CM

## 2023-11-27 DIAGNOSIS — N52.9 ERECTILE DYSFUNCTION, UNSPECIFIED ERECTILE DYSFUNCTION TYPE: ICD-10-CM

## 2023-11-27 RX ORDER — TADALAFIL 10 MG/1
10 TABLET ORAL DAILY PRN
Qty: 30 TABLET | Refills: 0 | Status: SHIPPED | OUTPATIENT
Start: 2023-11-27 | End: 2023-12-27

## 2023-11-27 RX ORDER — TIZANIDINE 4 MG/1
4 TABLET ORAL DAILY PRN
Qty: 21 TABLET | Refills: 0 | Status: SHIPPED | OUTPATIENT
Start: 2023-11-27

## 2023-11-27 RX ORDER — SEMAGLUTIDE 2.68 MG/ML
INJECTION, SOLUTION SUBCUTANEOUS
Qty: 3 ML | Refills: 1 | Status: SHIPPED | OUTPATIENT
Start: 2023-11-27

## 2023-11-27 NOTE — TELEPHONE ENCOUNTER
----- Message from Audrey Hicks MA sent at 11/27/2023 11:04 AM CST -----  Pt asked for refills on two medicines      [facility] [address]       Cialis - [facility] on [address]

## 2024-01-19 DIAGNOSIS — Z87.39 HISTORY OF GOUT: ICD-10-CM

## 2024-01-22 RX ORDER — ALLOPURINOL 300 MG/1
TABLET ORAL
Qty: 30 TABLET | Refills: 5 | Status: SHIPPED | OUTPATIENT
Start: 2024-01-22 | End: 2024-04-15 | Stop reason: SDUPTHER

## 2024-02-13 DIAGNOSIS — E78.5 HYPERLIPIDEMIA, UNSPECIFIED HYPERLIPIDEMIA TYPE: ICD-10-CM

## 2024-02-13 DIAGNOSIS — I10 HYPERTENSION, UNSPECIFIED TYPE: ICD-10-CM

## 2024-02-14 RX ORDER — LOSARTAN POTASSIUM 50 MG/1
50 TABLET ORAL
Qty: 30 TABLET | Refills: 2 | Status: SHIPPED | OUTPATIENT
Start: 2024-02-14 | End: 2024-04-15 | Stop reason: SDUPTHER

## 2024-02-14 RX ORDER — EZETIMIBE 10 MG/1
10 TABLET ORAL NIGHTLY
Qty: 30 TABLET | Refills: 2 | Status: SHIPPED | OUTPATIENT
Start: 2024-02-14 | End: 2024-04-15 | Stop reason: ALTCHOICE

## 2024-04-11 NOTE — PROGRESS NOTES
"SUBJECTIVE:  Jose Valadez is a 44 y.o. male here alone for dm, htn    HPI  Pt is in clinic with f/u on chronic meds. Denies to monitoring bs. States that he kept forgetting to take ozempic 2 mg. So he just stopped taking it. Pt states that he has been taking metformin. Pt states that the zetia was causing joint pain. So he stopped. Pt reports that since stopping he has been feeling better. Pt denies to monitor Bp while at home. States that when he feels bad he then goes to Dr. Has been taking losartan for bp. Feeling cialis 10 daily to help with improving stream. Feeling tired, not checking blood sugar at home.     Jose's allergies, medications, history, and problem list were updated as appropriate.    Review of Systems   Constitutional: Negative.    HENT:  Positive for postnasal drip.    Eyes: Negative.    Respiratory: Negative.     Cardiovascular: Negative.    Gastrointestinal: Negative.    Genitourinary: Negative.    Musculoskeletal:  Positive for arthralgias (hips and low back, shoulders and hand.  right hand swelling and day after eating seafoot.) and back pain.   Allergic/Immunologic: Positive for environmental allergies.   Neurological: Negative.    Hematological: Negative.    Psychiatric/Behavioral:  Positive for agitation and sleep disturbance. Negative for dysphoric mood and suicidal ideas. The patient is not nervous/anxious.       A comprehensive review of symptoms was completed and negative except as noted above.    OBJECTIVE:  Vital signs  Vitals:    04/15/24 0820   BP: 124/82   BP Location: Left arm   Patient Position: Sitting   Pulse: 70   Resp: 20   Temp: 97.8 °F (36.6 °C)   SpO2: 99%   Weight: (!) 139.3 kg (307 lb)   Height: 6' 1" (1.854 m)        Physical Exam  Vitals and nursing note reviewed.   Constitutional:       Appearance: Normal appearance. He is obese.   HENT:      Head: Normocephalic and atraumatic.      Right Ear: Tympanic membrane, ear canal and external ear normal.      Left Ear: " Tympanic membrane, ear canal and external ear normal.      Nose: Rhinorrhea present.      Mouth/Throat:      Mouth: Mucous membranes are moist.      Pharynx: Oropharynx is clear.   Eyes:      Extraocular Movements: Extraocular movements intact.      Conjunctiva/sclera: Conjunctivae normal.      Pupils: Pupils are equal, round, and reactive to light.   Cardiovascular:      Rate and Rhythm: Normal rate and regular rhythm.      Pulses: Normal pulses.      Heart sounds: Normal heart sounds.   Pulmonary:      Effort: Pulmonary effort is normal.      Breath sounds: Normal breath sounds.   Abdominal:      General: Abdomen is flat. Bowel sounds are normal.      Palpations: Abdomen is soft.      Comments: No significant tenderness to abdomen   Musculoskeletal:         General: Normal range of motion.      Cervical back: Normal range of motion.   Skin:     General: Skin is warm and dry.      Capillary Refill: Capillary refill takes less than 2 seconds.   Neurological:      General: No focal deficit present.      Mental Status: He is alert and oriented to person, place, and time.   Psychiatric:         Attention and Perception: Attention and perception normal.         Mood and Affect: Mood and affect normal.         Speech: Speech normal.         Behavior: Behavior normal. Behavior is cooperative.         Thought Content: Thought content normal.         Cognition and Memory: Cognition normal.          Office Visit on 04/15/2024   Component Date Value Ref Range Status    POC Glucose 04/15/2024 95  70 - 110 MG/DL Final          ASSESSMENT/PLAN:    1. Myalgia due to HMG CoA reductase inhibitor  Assessment & Plan:  In past has tried multiple statin with increased myalgia and intolerance. Low fat/low cholesterol diet.       2. Type 2 diabetes mellitus with hyperglycemia, without long-term current use of insulin  Assessment & Plan:  Not checking home glucose at home. Increase vegetable intake.   Hemoglobin A1C   Date Value Ref Range  Status   08/02/2022 5.5 4.0 - 6.0 % Final     Hemoglobin A1c   Date Value Ref Range Status   08/08/2023 5.4 4.8 - 5.6 % Final     Comment:              Prediabetes: 5.7 - 6.4           Diabetes: >6.4           Glycemic control for adults with diabetes: <7.0     04/14/2022 5.8 4.8 - 5.6     Off ozempic but not controlled with elevations and increased fatigue. Change to mounjaro 2.5 mg weekly, metformin 1000 daily. But will increase metformin 1000 twice daily.     Orders:  -     tirzepatide (MOUNJARO) 2.5 mg/0.5 mL PnIj; Inject 2.5 mg into the skin every 7 days.  Dispense: 4 Pen; Refill: 1    3. History of gout  Assessment & Plan:  Taking zylopril 300 mg daily. Check uric acid, cmp. The current medical regimen is effective;  continue present plan and medications. .will notify with lab results when available.       Orders:  -     Uric Acid  -     allopurinoL (ZYLOPRIM) 300 MG tablet; Take 1 tablet (300 mg total) by mouth once daily.  Dispense: 30 tablet; Refill: 5    4. Comprehensive diabetic foot examination, type 2 DM, encounter for  Assessment & Plan:  Protective Sensation (w/ 10 gram monofilament):  Right: Intact  Left: Intact    Visual Inspection:  Normal -  Bilateral    Pedal Pulses:   Right: Present  Left: Present    Posterior Tibialis Pulses:   Right:Present  Left: Present       5. Type 2 diabetes mellitus without complication, unspecified whether long term insulin use  Assessment & Plan:  Not checking home glucose at home. Increase vegetable intake.   Hemoglobin A1C   Date Value Ref Range Status   08/02/2022 5.5 4.0 - 6.0 % Final     Hemoglobin A1c   Date Value Ref Range Status   08/08/2023 5.4 4.8 - 5.6 % Final     Comment:              Prediabetes: 5.7 - 6.4           Diabetes: >6.4           Glycemic control for adults with diabetes: <7.0     04/14/2022 5.8 4.8 - 5.6     Off ozempic but not controlled with elevations and increased fatigue. Change to mounjaro 2.5 mg weekly, metformin 1000 daily. But will  increase metformin 1000 twice daily.     Orders:  -     POCT Glucose, Hand-Held Device  -     Hemoglobin A1C  -     Microalbumin/Creatinine Ratio, Urine  -     metFORMIN (GLUCOPHAGE-XR) 500 MG ER 24hr tablet; Take 2 tablets (1,000 mg total) by mouth 2 (two) times daily with meals.  Dispense: 120 tablet; Refill: 2    6. Hyperlipidemia, unspecified hyperlipidemia type  Assessment & Plan:  Patient stopped taking zetia, change to bempedoic acid/ezetimibe 180/10 daily. Call in 3-4 weeks for progress. . Check cmp, flp.  Statin intolerant in the past    Orders:  -     Lipid Panel  -     bempedoic acid-ezetimibe 180-10 mg Tab; Take 1 tablet by mouth once daily.  Dispense: 28 tablet; Refill: 0    7. Hypertension, unspecified type  -     CBC Auto Differential  -     Comprehensive Metabolic Panel  -     losartan (COZAAR) 50 MG tablet; Take 1 tablet (50 mg total) by mouth once daily.  Dispense: 30 tablet; Refill: 2    8. Obstructive sleep apnea syndrome  Assessment & Plan:  Tested positive for sleep apnea approximately 8 years ago with moderate case still feeling very tired discussed testosterone and fatigue and would need to treat the sleep apnea before being able to treat the lower testosterone but he reported was too expensive at the time.recheck sleep study and treat.       9. Benign hypertension  Assessment & Plan:  Losartan 50 mg daily. Check blood pressure bid while home and cll may need to increase dose. Controlled at this time.       10. Muscle spasm  -     tiZANidine (ZANAFLEX) 4 MG tablet; Take 1 tablet (4 mg total) by mouth daily as needed (muscle spasm).  Dispense: 21 tablet; Refill: 0    11. Erectile dysfunction, unspecified erectile dysfunction type  -     tadalafiL (CIALIS) 10 MG tablet; Take 1 tablet (10 mg total) by mouth daily as needed for Erectile Dysfunction.  Dispense: 30 tablet; Refill: 0    12. 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 There are various alternatives available to help with this difficult task, but first and foremost, he must make a firm commitment and decision to quit. The nature of nicotine addiction is discussed. The usefulness of behavioral therapy is discussed and suggested.  The correct use, cost and side effects of nicotine replacement therapy such as gum or patches is discussed. Bupropion and its cost (sometimes not covered fully by insurance) and side effects are reviewed. The quit rates are discussed. I recommend he not allow potential costs of treatment to deter him from using nicotine replacement therapy or bupropion, as the long term economic and health benefits are obvious. Using savita tobacco to help wean. Trying to quit. Using 6 mg pouches and try to wean.               Recent Results (from the past 24 hour(s))   POCT Glucose, Hand-Held Device    Collection Time: 04/15/24  8:20 AM   Result Value Ref Range    POC Glucose 95 70 - 110 MG/DL       Follow Up:  No follow-ups on file.      Discussed the diagnosis, prognosis, plan of care, chronic nature of and indications for, risks and benefits of treatment for conditions.  Continue all medications as prescribed unless otherwise noted.   Call if patient develops other symptoms or if not better in 2-3 days and sooner if worse. Emphasized the importance of compliance with all recommendations, including medication use and timely follow up. Instructed to return as noted be or sooner if patient develops any other problems or if there are any other additional questions or concerns.

## 2024-04-15 ENCOUNTER — OFFICE VISIT (OUTPATIENT)
Dept: FAMILY MEDICINE | Facility: CLINIC | Age: 44
End: 2024-04-15
Payer: COMMERCIAL

## 2024-04-15 VITALS
RESPIRATION RATE: 20 BRPM | BODY MASS INDEX: 40.69 KG/M2 | WEIGHT: 307 LBS | OXYGEN SATURATION: 99 % | HEART RATE: 70 BPM | SYSTOLIC BLOOD PRESSURE: 124 MMHG | DIASTOLIC BLOOD PRESSURE: 82 MMHG | HEIGHT: 73 IN | TEMPERATURE: 98 F

## 2024-04-15 DIAGNOSIS — E78.5 HYPERLIPIDEMIA, UNSPECIFIED HYPERLIPIDEMIA TYPE: ICD-10-CM

## 2024-04-15 DIAGNOSIS — M62.838 MUSCLE SPASM: ICD-10-CM

## 2024-04-15 DIAGNOSIS — F17.298 OTHER TOBACCO PRODUCT NICOTINE DEPENDENCE WITH OTHER NICOTINE-INDUCED DISORDER: ICD-10-CM

## 2024-04-15 DIAGNOSIS — E11.65 TYPE 2 DIABETES MELLITUS WITH HYPERGLYCEMIA, WITHOUT LONG-TERM CURRENT USE OF INSULIN: ICD-10-CM

## 2024-04-15 DIAGNOSIS — G47.33 OBSTRUCTIVE SLEEP APNEA SYNDROME: ICD-10-CM

## 2024-04-15 DIAGNOSIS — M79.10 MYALGIA DUE TO HMG COA REDUCTASE INHIBITOR: Primary | ICD-10-CM

## 2024-04-15 DIAGNOSIS — T46.6X5A MYALGIA DUE TO HMG COA REDUCTASE INHIBITOR: Primary | ICD-10-CM

## 2024-04-15 DIAGNOSIS — E11.9 TYPE 2 DIABETES MELLITUS WITHOUT COMPLICATION, UNSPECIFIED WHETHER LONG TERM INSULIN USE: ICD-10-CM

## 2024-04-15 DIAGNOSIS — I10 HYPERTENSION, UNSPECIFIED TYPE: ICD-10-CM

## 2024-04-15 DIAGNOSIS — Z87.39 HISTORY OF GOUT: ICD-10-CM

## 2024-04-15 DIAGNOSIS — N52.9 ERECTILE DYSFUNCTION, UNSPECIFIED ERECTILE DYSFUNCTION TYPE: ICD-10-CM

## 2024-04-15 DIAGNOSIS — E11.9 COMPREHENSIVE DIABETIC FOOT EXAMINATION, TYPE 2 DM, ENCOUNTER FOR: ICD-10-CM

## 2024-04-15 DIAGNOSIS — I10 BENIGN HYPERTENSION: ICD-10-CM

## 2024-04-15 LAB — GLUCOSE SERPL-MCNC: 95 MG/DL (ref 70–110)

## 2024-04-15 PROCEDURE — 3074F SYST BP LT 130 MM HG: CPT | Mod: CPTII,,, | Performed by: NURSE PRACTITIONER

## 2024-04-15 PROCEDURE — 3008F BODY MASS INDEX DOCD: CPT | Mod: CPTII,,, | Performed by: NURSE PRACTITIONER

## 2024-04-15 PROCEDURE — 4010F ACE/ARB THERAPY RXD/TAKEN: CPT | Mod: CPTII,,, | Performed by: NURSE PRACTITIONER

## 2024-04-15 PROCEDURE — 99214 OFFICE O/P EST MOD 30 MIN: CPT | Mod: ,,, | Performed by: NURSE PRACTITIONER

## 2024-04-15 PROCEDURE — 82962 GLUCOSE BLOOD TEST: CPT | Mod: ,,, | Performed by: NURSE PRACTITIONER

## 2024-04-15 PROCEDURE — 3079F DIAST BP 80-89 MM HG: CPT | Mod: CPTII,,, | Performed by: NURSE PRACTITIONER

## 2024-04-15 PROCEDURE — 1160F RVW MEDS BY RX/DR IN RCRD: CPT | Mod: CPTII,,, | Performed by: NURSE PRACTITIONER

## 2024-04-15 PROCEDURE — 1159F MED LIST DOCD IN RCRD: CPT | Mod: CPTII,,, | Performed by: NURSE PRACTITIONER

## 2024-04-15 RX ORDER — LOSARTAN POTASSIUM 50 MG/1
50 TABLET ORAL DAILY
Qty: 30 TABLET | Refills: 2 | Status: SHIPPED | OUTPATIENT
Start: 2024-04-15 | End: 2024-05-14 | Stop reason: SDUPTHER

## 2024-04-15 RX ORDER — METFORMIN HYDROCHLORIDE 500 MG/1
1000 TABLET, EXTENDED RELEASE ORAL 2 TIMES DAILY WITH MEALS
Qty: 120 TABLET | Refills: 2 | Status: SHIPPED | OUTPATIENT
Start: 2024-04-15

## 2024-04-15 RX ORDER — TIRZEPATIDE 2.5 MG/.5ML
2.5 INJECTION, SOLUTION SUBCUTANEOUS
Qty: 4 PEN | Refills: 1 | Status: SHIPPED | OUTPATIENT
Start: 2024-04-15 | End: 2024-05-14 | Stop reason: DRUGHIGH

## 2024-04-15 RX ORDER — ALLOPURINOL 300 MG/1
300 TABLET ORAL DAILY
Qty: 30 TABLET | Refills: 5 | Status: SHIPPED | OUTPATIENT
Start: 2024-04-15

## 2024-04-15 RX ORDER — TIZANIDINE 4 MG/1
4 TABLET ORAL DAILY PRN
Qty: 21 TABLET | Refills: 0 | Status: SHIPPED | OUTPATIENT
Start: 2024-04-15 | End: 2024-05-02

## 2024-04-15 RX ORDER — TADALAFIL 10 MG/1
10 TABLET ORAL DAILY PRN
Qty: 30 TABLET | Refills: 0 | Status: SHIPPED | OUTPATIENT
Start: 2024-04-15 | End: 2024-04-18 | Stop reason: SDUPTHER

## 2024-04-15 NOTE — ASSESSMENT & PLAN NOTE
Taking zylopril 300 mg daily. Check uric acid, cmp. The current medical regimen is effective;  continue present plan and medications. .will notify with lab results when available.

## 2024-04-15 NOTE — ASSESSMENT & PLAN NOTE
Not checking home glucose at home. Increase vegetable intake.   Hemoglobin A1C   Date Value Ref Range Status   08/02/2022 5.5 4.0 - 6.0 % Final     Hemoglobin A1c   Date Value Ref Range Status   08/08/2023 5.4 4.8 - 5.6 % Final     Comment:              Prediabetes: 5.7 - 6.4           Diabetes: >6.4           Glycemic control for adults with diabetes: <7.0     04/14/2022 5.8 4.8 - 5.6     Off ozempic but not controlled with elevations and increased fatigue. Change to mounjaro 2.5 mg weekly, metformin 1000 daily. But will increase metformin 1000 twice daily.

## 2024-04-15 NOTE — ASSESSMENT & PLAN NOTE
In past has tried multiple statin with increased myalgia and intolerance. Low fat/low cholesterol diet.

## 2024-04-15 NOTE — ASSESSMENT & PLAN NOTE
Patient stopped taking zetia, change to bempedoic acid/ezetimibe 180/10 daily. Call in 3-4 weeks for progress. . Check cmp, flp.  Statin intolerant in the past

## 2024-04-15 NOTE — ASSESSMENT & PLAN NOTE
Protective Sensation (w/ 10 gram monofilament):  Right: Intact  Left: Intact    Visual Inspection:  Normal -  Bilateral    Pedal Pulses:   Right: Present  Left: Present    Posterior Tibialis Pulses:   Right:Present  Left: Present

## 2024-04-15 NOTE — ASSESSMENT & PLAN NOTE
Losartan 50 mg daily. Check blood pressure bid while home and cll may need to increase dose. Controlled at this time.

## 2024-04-15 NOTE — ASSESSMENT & PLAN NOTE
Tested positive for sleep apnea approximately 8 years ago with moderate case still feeling very tired discussed testosterone and fatigue and would need to treat the sleep apnea before being able to treat the lower testosterone but he reported was too expensive at the time.recheck sleep study and treat.

## 2024-04-15 NOTE — ASSESSMENT & PLAN NOTE
It is very important that he quit smoking. There are various alternatives available to help with this difficult task, but first and foremost, he must make a firm commitment and decision to quit. The nature of nicotine addiction is discussed. The usefulness of behavioral therapy is discussed and suggested.  The correct use, cost and side effects of nicotine replacement therapy such as gum or patches is discussed. Bupropion and its cost (sometimes not covered fully by insurance) and side effects are reviewed. The quit rates are discussed. I recommend he not allow potential costs of treatment to deter him from using nicotine replacement therapy or bupropion, as the long term economic and health benefits are obvious. Using savita tobacco to help wean. Trying to quit. Using 6 mg pouches and try to wean.

## 2024-04-16 LAB
ALBUMIN/CREAT UR: NORMAL MG/G CREAT
CREAT UR-MCNC: 228.4 MG/DL
MICROALBUMIN UR-MCNC: <12 UG/ML

## 2024-04-17 LAB
ALBUMIN SERPL-MCNC: 4.6 G/DL (ref 4.1–5.1)
ALBUMIN/GLOB SERPL: 1.9 {RATIO} (ref 1.2–2.2)
ALP SERPL-CCNC: 112 IU/L (ref 44–121)
ALT SERPL-CCNC: 39 IU/L (ref 0–44)
AST SERPL-CCNC: 29 IU/L (ref 0–40)
BASOPHILS # BLD AUTO: 0.1 X10E3/UL (ref 0–0.2)
BASOPHILS NFR BLD AUTO: 1 %
BILIRUB SERPL-MCNC: 0.6 MG/DL (ref 0–1.2)
BUN SERPL-MCNC: 16 MG/DL (ref 6–24)
BUN/CREAT SERPL: 16 (ref 9–20)
CALCIUM SERPL-MCNC: 9.7 MG/DL (ref 8.7–10.2)
CHLORIDE SERPL-SCNC: 101 MMOL/L (ref 96–106)
CHOLEST SERPL-MCNC: 172 MG/DL (ref 100–199)
CO2 SERPL-SCNC: 23 MMOL/L (ref 20–29)
CREAT SERPL-MCNC: 0.97 MG/DL (ref 0.76–1.27)
EOSINOPHIL # BLD AUTO: 0.2 X10E3/UL (ref 0–0.4)
EOSINOPHIL NFR BLD AUTO: 3 %
ERYTHROCYTE [DISTWIDTH] IN BLOOD BY AUTOMATED COUNT: 13.2 % (ref 11.6–15.4)
EST. GFR  (NO RACE VARIABLE): 99 ML/MIN/1.73
GLOBULIN SER CALC-MCNC: 2.4 G/DL (ref 1.5–4.5)
GLUCOSE SERPL-MCNC: 97 MG/DL (ref 70–99)
HBA1C MFR BLD: 5.8 % (ref 4.8–5.6)
HCT VFR BLD AUTO: 44.7 % (ref 37.5–51)
HDLC SERPL-MCNC: 28 MG/DL
HGB BLD-MCNC: 15.3 G/DL (ref 13–17.7)
IMM GRANULOCYTES NFR BLD AUTO: 1 %
LDLC SERPL CALC-MCNC: 109 MG/DL (ref 0–99)
LYMPHOCYTES # BLD AUTO: 2.6 X10E3/UL (ref 0.7–3.1)
LYMPHOCYTES NFR BLD AUTO: 38 %
MCH RBC QN AUTO: 29.5 PG (ref 26.6–33)
MCHC RBC AUTO-ENTMCNC: 34.2 G/DL (ref 31.5–35.7)
MCV RBC AUTO: 86 FL (ref 79–97)
MONOCYTES # BLD AUTO: 0.6 X10E3/UL (ref 0.1–0.9)
MONOCYTES NFR BLD AUTO: 9 %
NEUTROPHILS # BLD AUTO: 3.3 X10E3/UL (ref 1.4–7)
NEUTROPHILS NFR BLD AUTO: 48 %
PLATELET # BLD AUTO: 206 X10E3/UL (ref 150–450)
POTASSIUM SERPL-SCNC: 4.2 MMOL/L (ref 3.5–5.2)
PROT SERPL-MCNC: 7 G/DL (ref 6–8.5)
RBC # BLD AUTO: 5.19 X10E6/UL (ref 4.14–5.8)
SODIUM SERPL-SCNC: 139 MMOL/L (ref 134–144)
TRIGL SERPL-MCNC: 199 MG/DL (ref 0–149)
URATE SERPL-MCNC: 5.6 MG/DL (ref 3.8–8.4)
VLDLC SERPL CALC-MCNC: 35 MG/DL (ref 5–40)
WBC # BLD AUTO: 6.9 X10E3/UL (ref 3.4–10.8)

## 2024-04-18 ENCOUNTER — TELEPHONE (OUTPATIENT)
Dept: FAMILY MEDICINE | Facility: CLINIC | Age: 44
End: 2024-04-18
Payer: COMMERCIAL

## 2024-04-18 DIAGNOSIS — N52.9 ERECTILE DYSFUNCTION, UNSPECIFIED ERECTILE DYSFUNCTION TYPE: ICD-10-CM

## 2024-04-18 RX ORDER — TADALAFIL 10 MG/1
10 TABLET ORAL DAILY PRN
Qty: 30 TABLET | Refills: 0 | Status: SHIPPED | OUTPATIENT
Start: 2024-04-18 | End: 2024-05-14 | Stop reason: SDUPTHER

## 2024-04-18 NOTE — TELEPHONE ENCOUNTER
Called patient to see if he has had diabetic eye exam in the last year. States he has not but will call [facility] and make appointment. Also wanted script for cialis sent to [facility] on [facility] in [place].

## 2024-04-18 NOTE — TELEPHONE ENCOUNTER
----- Message from Gladys Chavez DNP sent at 4/17/2024  5:44 PM CDT -----  Notify patient hemoglobin A1c has been rising but still fairly well-controlled at 5.8.  Goal for LDL less seventy currently at 109 and he complains of statin intolerance with very unhealthy HDL/LDL cholesterol ratio.  CBC CMP uric acid all therapeutic continue Zyloprim 300 daily with next visit Glucophage and module row but follow-up in 1 month for Mounjaro adjustment recheck CMP FLP uric acid hemoglobin A1c in six-month

## 2024-05-02 DIAGNOSIS — M62.838 MUSCLE SPASM: ICD-10-CM

## 2024-05-02 RX ORDER — TIZANIDINE 4 MG/1
TABLET ORAL
Qty: 21 TABLET | Refills: 0 | Status: SHIPPED | OUTPATIENT
Start: 2024-05-02 | End: 2024-05-27

## 2024-05-06 ENCOUNTER — TELEPHONE (OUTPATIENT)
Dept: FAMILY MEDICINE | Facility: CLINIC | Age: 44
End: 2024-05-06
Payer: COMMERCIAL

## 2024-05-06 NOTE — TELEPHONE ENCOUNTER
----- Message from Clementina Schumacher sent at 5/6/2024  3:26 PM CDT -----  Regarding: Letter and call back  He called again and told me he had stopped the Zetia and started another one (he said a name but I am sure he said it wrong).    This new medication has a warning that it may cause drowsiness (among other side effects) and he needs a letter from his provider stating that he is fine working and taking this medication.  He says he has no side affects with the new medication.    I have an email to send a letter if provided.

## 2024-05-06 NOTE — LETTER
May 8, 2024      [facility]  [address] [place] [id number]  Phone: [phone]  Fax: [phone]       Patient: Jose Valadez   YOB: 1980  Date of Visit: 05/06/2024    To Whom It May Concern:    Hugh Valadez is under my care and has been taking Nexlizet (bempedoic acid-ezetimibe) 180/10mg tablet for cholesterol and is tolerating well. Joint pain that he was having with prior medication has subsided and patient not having drowsiness with medication. Patient is also taking mounjaro 2.5 mg once a week for diabetes and is tolerating well. Having no negative side effects such as drowsiness. If you have any questions or concerns, or if I can be of further assistance, please do not hesitate to contact me.      Sincerely,    BOBBY Elziabeth

## 2024-05-06 NOTE — TELEPHONE ENCOUNTER
Letter written to state that patient is doing well on medication, joint pain has almost subsided and does not have any drowsiness with medication.

## 2024-05-06 NOTE — LETTER
May 6, 2024      [facility]  [address] [place] [id number]  Phone: [phone]  Fax: [phone]       Patient: Jose Valadez   YOB: 1980      To Whom It May Concern:    Hugh Valadez is under my care and has been taking Nexlizet (bempedoic acid-ezetimibe) 180/10mg tablet for cholesterol and is tolerating well. Joint pain that he was having with prior medication has subsided and patient not having drowsiness with medication.  If you have any questions or concerns, or if I can be of further assistance, please do not hesitate to contact me.    Sincerely,        Gladys Chavez,DNP

## 2024-05-08 NOTE — TELEPHONE ENCOUNTER
Letter was done and given to kishan to email.   [facility] Medicine  Discharge Summary      Patient Name: Morgan Caro  MRN: [id number]  AIYANA: [id number]  Patient Class: IP- Inpatient  Admission Date: 6/14/2023  Hospital Length of Stay: 3 days  Discharge Date and Time:  06/18/2023 7:55 AM  Attending Physician: Silas Cain MD   Discharging Provider: Silas Cain MD  Primary Care Provider: [facility]    Primary Care Team: Networked reference to record PCT     HPI:   Ms Caro is a 45-year-old -American female who presents to the emergency room, after suffering a reported syncopal event.  Unclear if she had any trauma but does complain of back pain, headaches, leg and chest wall pain.  She was recently admitted to the hospital on 6/9/2023 during which time she was admitted for thyrotoxicosis.  Reported medical history significant for hypothyroidism, hypertension, back grains and had a similar presentation back in March 2023 when she was admitted for thyrotoxicosis and was admitted in the ICU at the time.  Before that March hospitalization she had admission back in [facility] for 2 days and left AGAINST MEDICAL ADVICE from there.  After she was stabilized, she again left AGAINST MEDICAL ADVICE on 6/12/2023.    In the ER, she was noted to be initially tachycardic with 122 bpm, with blood pressure 117/70 her CBC appears grossly unremarkable, her BMP shows normal renal function, has elevated alk phos that has been there previously, minimally elevated troponin of 0.035 from demand ischemia, denies any chest pains, her TSH is less than 0.01 with elevated free T4 of 2.86.  Her x-ray of the thoracic spine is negative, lumbar spine is negative, DVT ultrasound negative for VTE, her CT head reveals heterogeneity in the calvarium that is multifocal and scattered with possibility of osseous metastatic disease, CT C-spine is negative for acute fractures.    She received IV propranolol followed by p.o.,  mg in the  ER along with IV Decadron with control of her symptoms.  She is being admitted for further evaluation and treatment.      * No surgery found *      Hospital Course:   45F with pmh of thyrotoxicosis presented initially for reported syncopal episode. She was recently admitted to the hospital on 6/9/2023 during which time she was admitted for thyrotoxicosis.  Reported medical history significant for hypothyroidism, hypertension, back grains and had a similar presentation back in March 2023 when she was admitted for thyrotoxicosis and was admitted in the ICU at the time.  Before that March hospitalization she had admission back in [facility] for 2 days and left AGAINST MEDICAL ADVICE from there.  After she was stabilized, she again left AGAINST MEDICAL ADVICE on 6/12/2023. In the ER, she was noted to be initially tachycardic with 122 bpm, with blood pressure 117/70 her CBC appears grossly unremarkable, her BMP shows normal renal function, has elevated alk phos that has been there previously, minimally elevated troponin of 0.035 from demand ischemia, denies any chest pains, her TSH is less than 0.01 with elevated free T4 of 2.86.  Her x-ray of the thoracic spine is negative, lumbar spine is negative, DVT ultrasound negative for VTE, her CT head reveals heterogeneity in the calvarium that is multifocal and scattered with possibility of osseous metastatic disease. Pt was placed under PEC by ED and initial psychiatry recommendations were to josué. Pt continued on tx for thyrotoxicosis. thyroid labs improving. Likely can d/c steroids tomorrow. Free T4 close to normalizing. Likely can transition to home doses of medications. Needed refills which have been sent. Psychiatry re-evaluated pt and recommended continuing PEC while inpatient, but can be rescinded at time of d/c if pt compliant during hospitalization.patient remains clomipramine with medications.alert and  oriented and calm,denies SI,HI at DC time,PEC  discontinued.thyroid level is much improved,methimazole and atenolol prescribed,out patient hematology,endocrinology is done,patient was discharged home with follow up plan with PCP,endocrinology as out patient.       Goals of Care Treatment Preferences:  Code Status: Full Code      Consults:   Consults (From admission, onward)        Status Ordering Provider     Inpatient consult to Social Work  Once        Provider:  (Not yet assigned)    Ordered CONSTANCE FLEMING     Inpatient consult to Hematology/Oncology - [facility]  Once        Provider:  Qasim Hadley MD    Completed DENVER KENNEDY III     Inpatient consult to Telemedicine - Psyc  Once        Provider:  (Not yet assigned)    Completed DENVER KENNEDY III     Inpatient consult to Telemedicine - Psych  Once        Provider:  (Not yet assigned)    Acknowledged DENVER KENNEDY III     Inpatient consult to Telemedicine - Psychiatry  Once        Provider:  Ying Trevino MD    Completed DYLAN MENDEZ          No new Assessment & Plan notes have been filed under this hospital service since the last note was generated.  Service: Hospital Medicine    Final Active Diagnoses:    Diagnosis Date Noted POA    PRINCIPAL PROBLEM:  Thyrotoxicosis with thyrotoxic crisis [E05.91] 03/04/2023 Yes    Abnormal CT of the head [R93.0] 06/15/2023 Yes    Noncompliance by refusing intervention or support [Z91.199] 06/15/2023 Not Applicable    Syncope and collapse [R55] 06/15/2023 Yes    Primary hypertension [I10] 03/04/2023 Yes      Problems Resolved During this Admission:       Discharged Condition: stable    Disposition: Home or Self Care    Follow Up:   Follow-up Information     [facility] - Endo/Diabetes/Coumadin .    Specialties: Endocrinology, Nephrology  Contact information:  [address] [address]  [phone]             Clifton Bennett MD .    Specialty: Hematology and Oncology  Contact information:  [address]  [place] [address]  [phone]             [facility] [place] Follow up in 1 week(s).    Contact information:  [address] [place] [address]  [phone]             [facility]. Schedule an appointment as soon as possible for a visit in 1 week(s).    Why: Out-Patient Mental Health services  Contact information:  [address]  [place], [place] [id number] [phone]                     Patient Instructions:      Ambulatory referral/consult to Endocrinology   Standing Status: Future   Referral Priority: Routine Referral Type: Consultation   Referred to Provider: [facility] - ENDO/DIABETES/COUMADIN Requested Specialty: Endocrinology   Number of Visits Requested: 1     Ambulatory referral/consult to Hematology / Oncology   Standing Status: Future   Referral Priority: Routine Referral Type: Consultation   Referral Reason: Specialty Services Required   Referred to Provider: TESSIE TORRES Requested Specialty: Hematology and Oncology   Number of Visits Requested: 1     Ambulatory referral/consult to [facility] & Palliative   Standing Status: Future   Referral Priority: Urgent Referral Type: Consultation   Referral Reason: Specialty Services Required   Number of Visits Requested: 1     Activity as tolerated       Significant Diagnostic Studies: Labs:   BMP:   Recent Labs   Lab 06/17/23 0342   *      K 4.1      CO2 27   BUN 17   CREATININE 0.5   CALCIUM 9.2   MG 1.5*   , CMP   Recent Labs   Lab 06/17/23 0342      K 4.1      CO2 27   *   BUN 17   CREATININE 0.5   CALCIUM 9.2   ANIONGAP 7*    and CBC No results for input(s): WBC, HGB, HCT, PLT in the last 48 hours.    Pending Diagnostic Studies:     Procedure Component Value Units Date/Time    T3 [[id number]] Collected: 06/18/23 0407    Order Status: Sent Lab Status: In process Updated: 06/18/23 0408    Specimen: Blood     T4 [[id number]] Collected: 06/18/23 0407     Order Status: Sent Lab Status: In process Updated: 06/18/23 0408    Specimen: Blood          Medications:  Reconciled Home Medications:      Medication List      START taking these medications    QUEtiapine 100 MG Tab  Commonly known as: SEROQUEL  Take 1 tablet (100 mg total) by mouth every evening.        CHANGE how you take these medications    atenoloL 25 MG tablet  Commonly known as: TENORMIN  Take 1 tablet (25 mg total) by mouth once daily.  What changed: when to take this        CONTINUE taking these medications    EScitalopram oxalate 5 MG Tab  Commonly known as: LEXAPRO  Take 1 tablet (5 mg total) by mouth once daily.     methIMAzole 10 MG Tab  Commonly known as: TAPAZOLE  Take 1 tablet by mouth twice daily for 1 week, then reduce to 1 tablet by mouth once daily. Go to Endocrinologist for adjustments asap.        STOP taking these medications    nicotine 21 mg/24 hr  Commonly known as: NICODERM CQ            Indwelling Lines/Drains at time of discharge:   Lines/Drains/Airways     None                 Time spent on the discharge of patient: over 30  minutes         Silas Cain MD  Department of Hospital Medicine  [facility]

## 2024-05-14 ENCOUNTER — OFFICE VISIT (OUTPATIENT)
Dept: FAMILY MEDICINE | Facility: CLINIC | Age: 44
End: 2024-05-14
Payer: COMMERCIAL

## 2024-05-14 DIAGNOSIS — E11.65 TYPE 2 DIABETES MELLITUS WITH HYPERGLYCEMIA, WITHOUT LONG-TERM CURRENT USE OF INSULIN: ICD-10-CM

## 2024-05-14 DIAGNOSIS — N52.9 ERECTILE DYSFUNCTION, UNSPECIFIED ERECTILE DYSFUNCTION TYPE: ICD-10-CM

## 2024-05-14 DIAGNOSIS — I10 HYPERTENSION, UNSPECIFIED TYPE: ICD-10-CM

## 2024-05-14 DIAGNOSIS — E78.5 HYPERLIPIDEMIA, UNSPECIFIED HYPERLIPIDEMIA TYPE: ICD-10-CM

## 2024-05-14 DIAGNOSIS — I10 BENIGN HYPERTENSION: Primary | ICD-10-CM

## 2024-05-14 PROCEDURE — 4010F ACE/ARB THERAPY RXD/TAKEN: CPT | Mod: CPTII,95,, | Performed by: NURSE PRACTITIONER

## 2024-05-14 PROCEDURE — 3044F HG A1C LEVEL LT 7.0%: CPT | Mod: CPTII,95,, | Performed by: NURSE PRACTITIONER

## 2024-05-14 PROCEDURE — 1159F MED LIST DOCD IN RCRD: CPT | Mod: CPTII,95,, | Performed by: NURSE PRACTITIONER

## 2024-05-14 PROCEDURE — 1160F RVW MEDS BY RX/DR IN RCRD: CPT | Mod: CPTII,95,, | Performed by: NURSE PRACTITIONER

## 2024-05-14 PROCEDURE — 99214 OFFICE O/P EST MOD 30 MIN: CPT | Mod: 95,,, | Performed by: NURSE PRACTITIONER

## 2024-05-14 RX ORDER — TADALAFIL 10 MG/1
10 TABLET ORAL DAILY PRN
Qty: 60 TABLET | Refills: 1 | Status: SHIPPED | OUTPATIENT
Start: 2024-05-14

## 2024-05-14 RX ORDER — TIRZEPATIDE 5 MG/.5ML
5 INJECTION, SOLUTION SUBCUTANEOUS
Qty: 4 PEN | Refills: 1 | Status: SHIPPED | OUTPATIENT
Start: 2024-05-14

## 2024-05-14 RX ORDER — LOSARTAN POTASSIUM 50 MG/1
50 TABLET ORAL DAILY
Qty: 90 TABLET | Refills: 2 | Status: SHIPPED | OUTPATIENT
Start: 2024-05-14

## 2024-05-14 NOTE — ASSESSMENT & PLAN NOTE
Nexlizet 180/10 one daily working well without side effects. The current medical regimen is effective;  continue present plan and medications.

## 2024-05-14 NOTE — ASSESSMENT & PLAN NOTE
Losartan 50 mg 1 p.o. q.d. blood pressure is well-controlled by report. The current medical regimen is effective;  continue present plan and medications.

## 2024-05-14 NOTE — PROGRESS NOTES
"The following appointment was scheduled and conducted using telemedicine services.   Start time:11:20  The patient location is: [place], [place]  End time:  11:37  Total time spent with patient: 17  Each patient to whom he or she provides medical services by telemedicine is:  (1) informed of the relationship between the physician and patient and the respective role of any other health care provider with respect to management of the patient; and (2) notified that he or she may decline to receive medical services by telemedicine and may withdraw from such care at any time.    Subjective:       Patient ID: Jose Valadez is a 44 y.o. male.    Chief Complaint: Diabetes    Lost 6# since starting Mounjaro. 2.5. no heartburn, decreased appetite. Not checking blood sugar but feeling "so much better." More energy, not tired as before, since changing to bempedoic acid/ezitimibe no longer experiencing joint pain or myalgia. Resting well at night without drop in blood sugar.  Reported lost 6 lb since starting medication and feeling better.    Diabetes  He presents for his follow-up diabetic visit. He has type 2 diabetes mellitus. No MedicAlert identification noted. Pertinent negatives for hypoglycemia include no headaches, hunger, mood changes, nervousness/anxiousness, sleepiness, sweats or tremors. Pertinent negatives for diabetes include no chest pain. There are no hypoglycemic complications. He is compliant with treatment all of the time. His weight is decreasing steadily.     Review of Systems   Constitutional:  Negative for activity change.   HENT:  Negative for hearing loss and trouble swallowing.    Eyes:  Negative for discharge.   Respiratory:  Negative for chest tightness and wheezing.    Cardiovascular:  Negative for chest pain and palpitations.   Gastrointestinal:  Negative for constipation, diarrhea and vomiting.   Genitourinary:  Negative for difficulty urinating and hematuria.   Allergic/Immunologic: Negative for " environmental allergies.   Neurological:  Negative for tremors and headaches.   Psychiatric/Behavioral:  Negative for dysphoric mood. The patient is not nervous/anxious.          Objective:      Physical Exam  Vitals and nursing note reviewed.   Constitutional:       General: He is not in acute distress.     Appearance: Normal appearance. He is obese. He is not ill-appearing or diaphoretic.   HENT:      Head: Normocephalic.   Eyes:      General: No scleral icterus.  Neurological:      Mental Status: He is alert and oriented to person, place, and time.   Psychiatric:         Mood and Affect: Mood normal.         Behavior: Behavior normal.         Thought Content: Thought content normal.         Judgment: Judgment normal.        The patient consult was performed using video-face-to face telemedicine services. The patient is without apparent distress during the call.   Assessment/Plan:       1. Benign hypertension  Assessment & Plan:  Losartan 50 mg 1 p.o. q.d. blood pressure is well-controlled by report. The current medical regimen is effective;  continue present plan and medications.        2. Hyperlipidemia, unspecified hyperlipidemia type  Assessment & Plan:  Nexlizet 180/10 one daily working well without side effects. The current medical regimen is effective;  continue present plan and medications.      Orders:  -     Discontinue: bempedoic acid-ezetimibe 180-10 mg Tab; Take 1 tablet by mouth once daily.  Dispense: 28 tablet; Refill: 0  -     bempedoic acid-ezetimibe 180-10 mg Tab; Take 1 tablet by mouth once daily.  Dispense: 60 tablet; Refill: 1    3. Erectile dysfunction, unspecified erectile dysfunction type  -     tadalafiL (CIALIS) 10 MG tablet; Take 1 tablet (10 mg total) by mouth daily as needed for Erectile Dysfunction.  Dispense: 60 tablet; Refill: 1    4. Hypertension, unspecified type  -     losartan (COZAAR) 50 MG tablet; Take 1 tablet (50 mg total) by mouth once daily.  Dispense: 90 tablet; Refill:  2    5. Type 2 diabetes mellitus with hyperglycemia, without long-term current use of insulin  Assessment & Plan:  Patient has tolerated Mounjaro 2.5 weekly for the past 7 days and just filled next month.  No GI upset no change in bowel patterns tolerating module row with out any side effects blood sugar much better improved energy.  The few times he is checked his blood sugar much better controlled.  Increase module row 2 5 mg every 7 days continue metformin XR 1000 b.i.d. and return in 1 month to check tolerance.      Other orders  -     tirzepatide (MOUNJARO) 5 mg/0.5 mL PnIj; Inject 5 mg into the skin every 7 days.  Dispense: 4 Pen; Refill: 1           Follow up in about 4 weeks (around 6/11/2024).     Discussed the diagnosis, prognosis, plan of care, chronic nature of and indications for, risks and benefits of treatment for conditions.  Continue all medications as prescribed unless otherwise noted.   Call if patient develops other symptoms or if not better in 2-3 days and sooner if worse. Emphasized the importance of compliance with all recommendations, including medication use and timely follow up. Instructed to return as noted be or sooner if patient develops any other problems or if there are any other additional questions or concerns. If referral was discussed and agreed, please contact office within 2 weeks if have not heard from referring office.

## 2024-05-14 NOTE — ASSESSMENT & PLAN NOTE
Patient has tolerated Mounjaro 2.5 weekly for the past 7 days and just filled next month.  No GI upset no change in bowel patterns tolerating module row with out any side effects blood sugar much better improved energy.  The few times he is checked his blood sugar much better controlled.  Increase module row 2 5 mg every 7 days continue metformin XR 1000 b.i.d. and return in 1 month to check tolerance.

## 2024-05-24 DIAGNOSIS — M62.838 MUSCLE SPASM: ICD-10-CM

## 2024-05-27 RX ORDER — TIZANIDINE 4 MG/1
TABLET ORAL
Qty: 21 TABLET | Refills: 0 | Status: SHIPPED | OUTPATIENT
Start: 2024-05-27

## 2024-06-03 DIAGNOSIS — E11.65 TYPE 2 DIABETES MELLITUS WITH HYPERGLYCEMIA, WITHOUT LONG-TERM CURRENT USE OF INSULIN: ICD-10-CM

## 2024-06-03 RX ORDER — TIRZEPATIDE 2.5 MG/.5ML
INJECTION, SOLUTION SUBCUTANEOUS
Qty: 4 PEN | Refills: 1 | Status: SHIPPED | OUTPATIENT
Start: 2024-06-03

## 2024-06-22 DIAGNOSIS — M62.838 MUSCLE SPASM: ICD-10-CM

## 2024-06-24 RX ORDER — TIZANIDINE 4 MG/1
TABLET ORAL
Qty: 21 TABLET | Refills: 0 | Status: SHIPPED | OUTPATIENT
Start: 2024-06-24

## 2024-07-01 ENCOUNTER — TELEPHONE (OUTPATIENT)
Dept: FAMILY MEDICINE | Facility: CLINIC | Age: 44
End: 2024-07-01

## 2024-07-01 DIAGNOSIS — N52.9 ERECTILE DYSFUNCTION, UNSPECIFIED ERECTILE DYSFUNCTION TYPE: ICD-10-CM

## 2024-07-01 DIAGNOSIS — I10 HYPERTENSION, UNSPECIFIED TYPE: ICD-10-CM

## 2024-07-01 DIAGNOSIS — E78.5 HYPERLIPIDEMIA, UNSPECIFIED HYPERLIPIDEMIA TYPE: ICD-10-CM

## 2024-07-01 DIAGNOSIS — M62.838 MUSCLE SPASM: ICD-10-CM

## 2024-07-01 DIAGNOSIS — Z87.39 HISTORY OF GOUT: ICD-10-CM

## 2024-07-01 DIAGNOSIS — E11.9 TYPE 2 DIABETES MELLITUS WITHOUT COMPLICATION, UNSPECIFIED WHETHER LONG TERM INSULIN USE: ICD-10-CM

## 2024-07-01 RX ORDER — METFORMIN HYDROCHLORIDE 500 MG/1
1000 TABLET, EXTENDED RELEASE ORAL 2 TIMES DAILY WITH MEALS
Qty: 120 TABLET | Refills: 2 | Status: SHIPPED | OUTPATIENT
Start: 2024-07-01

## 2024-07-01 RX ORDER — LOSARTAN POTASSIUM 50 MG/1
50 TABLET ORAL DAILY
Qty: 90 TABLET | Refills: 2 | Status: SHIPPED | OUTPATIENT
Start: 2024-07-01

## 2024-07-01 RX ORDER — TIRZEPATIDE 5 MG/.5ML
5 INJECTION, SOLUTION SUBCUTANEOUS
Qty: 4 PEN | Refills: 1 | Status: SHIPPED | OUTPATIENT
Start: 2024-07-01

## 2024-07-01 RX ORDER — TADALAFIL 10 MG/1
10 TABLET ORAL DAILY PRN
Qty: 60 TABLET | Refills: 1 | Status: SHIPPED | OUTPATIENT
Start: 2024-07-01

## 2024-07-01 RX ORDER — ALLOPURINOL 300 MG/1
300 TABLET ORAL DAILY
Qty: 30 TABLET | Refills: 5 | Status: SHIPPED | OUTPATIENT
Start: 2024-07-01

## 2024-07-01 RX ORDER — TIZANIDINE 4 MG/1
4 TABLET ORAL EVERY 8 HOURS
Qty: 21 TABLET | Refills: 0 | Status: SHIPPED | OUTPATIENT
Start: 2024-07-01

## 2024-07-01 NOTE — TELEPHONE ENCOUNTER
----- Message from Shellie Metz sent at 7/1/2024  9:36 AM CDT -----  Patient needs refills on Tizanidine 4 mg, Metformin 500 mg, Nexlezet, Losartan 50mg, Allopurinol 300 mg and Monjuro called in to [facility] on [address].  Patient also needs Tadalafil 10 mg called in to [facility] on [address]

## 2024-07-10 DIAGNOSIS — E78.2 MIXED HYPERLIPIDEMIA: Primary | ICD-10-CM

## 2024-07-13 DIAGNOSIS — M62.838 MUSCLE SPASM: ICD-10-CM

## 2024-07-15 RX ORDER — TIZANIDINE 4 MG/1
TABLET ORAL
Qty: 21 TABLET | Refills: 0 | Status: SHIPPED | OUTPATIENT
Start: 2024-07-15

## 2024-07-24 DIAGNOSIS — E78.5 HYPERLIPIDEMIA, UNSPECIFIED HYPERLIPIDEMIA TYPE: ICD-10-CM

## 2024-07-24 DIAGNOSIS — M62.838 MUSCLE SPASM: ICD-10-CM

## 2024-07-24 DIAGNOSIS — E11.9 TYPE 2 DIABETES MELLITUS WITHOUT COMPLICATION, UNSPECIFIED WHETHER LONG TERM INSULIN USE: ICD-10-CM

## 2024-07-24 RX ORDER — EZETIMIBE 10 MG/1
10 TABLET ORAL NIGHTLY
Qty: 90 TABLET | Refills: 0 | Status: SHIPPED | OUTPATIENT
Start: 2024-07-24

## 2024-07-24 RX ORDER — TIZANIDINE 4 MG/1
TABLET ORAL
Qty: 21 TABLET | Refills: 0 | OUTPATIENT
Start: 2024-07-24

## 2024-07-24 RX ORDER — METFORMIN HYDROCHLORIDE 500 MG/1
1000 TABLET, EXTENDED RELEASE ORAL 2 TIMES DAILY WITH MEALS
Qty: 360 TABLET | Refills: 0 | Status: SHIPPED | OUTPATIENT
Start: 2024-07-24

## 2024-07-30 ENCOUNTER — TELEPHONE (OUTPATIENT)
Dept: FAMILY MEDICINE | Facility: CLINIC | Age: 44
End: 2024-07-30

## 2024-07-30 DIAGNOSIS — E78.2 MIXED HYPERLIPIDEMIA: ICD-10-CM

## 2024-07-30 NOTE — TELEPHONE ENCOUNTER
----- Message from Bonny Olivia sent at 7/30/2024  2:43 PM CDT -----  Regarding: NEXLIZET  NEXLIZET PA IN PROGRESS . I DID LET MALATHI KNOW. HE TOLD ME THAT HE HAS 3 DAYS SAMPLES LEFT . WOULD WE HAVE MORE IF ON THURSDAY I DONT RECEIVE THE PA AUTHORIZATION?  HIS WIFE DID  THE ZETIA, HE DOES UNDERSTAND IT CAN'T BE RETURNED TO HIS PHARMACY . HE SAYS HE WILL DISPOSE OF IT AS HE HAS MUSCLE ACHES WHEN TAKING IT.

## 2024-08-06 ENCOUNTER — OFFICE VISIT (OUTPATIENT)
Dept: FAMILY MEDICINE | Facility: CLINIC | Age: 44
End: 2024-08-06
Payer: COMMERCIAL

## 2024-08-06 VITALS
BODY MASS INDEX: 38.3 KG/M2 | RESPIRATION RATE: 20 BRPM | DIASTOLIC BLOOD PRESSURE: 79 MMHG | WEIGHT: 289 LBS | OXYGEN SATURATION: 98 % | HEIGHT: 73 IN | SYSTOLIC BLOOD PRESSURE: 130 MMHG | TEMPERATURE: 97 F | HEART RATE: 75 BPM

## 2024-08-06 DIAGNOSIS — E11.65 TYPE 2 DIABETES MELLITUS WITH HYPERGLYCEMIA, WITHOUT LONG-TERM CURRENT USE OF INSULIN: Primary | ICD-10-CM

## 2024-08-06 DIAGNOSIS — E78.5 HYPERLIPIDEMIA, UNSPECIFIED HYPERLIPIDEMIA TYPE: ICD-10-CM

## 2024-08-06 DIAGNOSIS — R79.89 LOW TESTOSTERONE IN MALE: ICD-10-CM

## 2024-08-06 DIAGNOSIS — Z87.39 HISTORY OF GOUT: ICD-10-CM

## 2024-08-06 DIAGNOSIS — I10 BENIGN HYPERTENSION: ICD-10-CM

## 2024-08-06 DIAGNOSIS — E55.9 VITAMIN D DEFICIENCY: ICD-10-CM

## 2024-08-06 DIAGNOSIS — E11.9 TYPE 2 DIABETES MELLITUS WITHOUT COMPLICATION, UNSPECIFIED WHETHER LONG TERM INSULIN USE: ICD-10-CM

## 2024-08-06 PROCEDURE — 99214 OFFICE O/P EST MOD 30 MIN: CPT | Mod: ,,, | Performed by: NURSE PRACTITIONER

## 2024-08-07 LAB
25(OH)D3+25(OH)D2 SERPL-MCNC: 35.9 NG/ML (ref 30–100)
ALBUMIN SERPL-MCNC: 4.8 G/DL (ref 4.1–5.1)
ALP SERPL-CCNC: 83 IU/L (ref 44–121)
ALT SERPL-CCNC: 48 IU/L (ref 0–44)
AST SERPL-CCNC: 40 IU/L (ref 0–40)
BASOPHILS # BLD AUTO: 0.1 X10E3/UL (ref 0–0.2)
BASOPHILS NFR BLD AUTO: 1 %
BILIRUB SERPL-MCNC: 0.4 MG/DL (ref 0–1.2)
BUN SERPL-MCNC: 14 MG/DL (ref 6–24)
BUN/CREAT SERPL: 14 (ref 9–20)
CALCIUM SERPL-MCNC: 9.7 MG/DL (ref 8.7–10.2)
CHLORIDE SERPL-SCNC: 104 MMOL/L (ref 96–106)
CHOLEST SERPL-MCNC: 90 MG/DL (ref 100–199)
CO2 SERPL-SCNC: 19 MMOL/L (ref 20–29)
CREAT SERPL-MCNC: 1.02 MG/DL (ref 0.76–1.27)
EOSINOPHIL # BLD AUTO: 0.2 X10E3/UL (ref 0–0.4)
EOSINOPHIL NFR BLD AUTO: 2 %
ERYTHROCYTE [DISTWIDTH] IN BLOOD BY AUTOMATED COUNT: 13.7 % (ref 11.6–15.4)
EST. GFR  (NO RACE VARIABLE): 93 ML/MIN/1.73
GLOBULIN SER CALC-MCNC: 2.6 G/DL (ref 1.5–4.5)
GLUCOSE SERPL-MCNC: 89 MG/DL (ref 70–99)
HBA1C MFR BLD: 5.2 % (ref 4.8–5.6)
HCT VFR BLD AUTO: 43 % (ref 37.5–51)
HDLC SERPL-MCNC: 24 MG/DL
HGB BLD-MCNC: 14.4 G/DL (ref 13–17.7)
IMM GRANULOCYTES NFR BLD AUTO: 1 %
LDLC SERPL CALC-MCNC: 37 MG/DL (ref 0–99)
LYMPHOCYTES # BLD AUTO: 2.2 X10E3/UL (ref 0.7–3.1)
LYMPHOCYTES NFR BLD AUTO: 31 %
MCH RBC QN AUTO: 28.5 PG (ref 26.6–33)
MCHC RBC AUTO-ENTMCNC: 33.5 G/DL (ref 31.5–35.7)
MCV RBC AUTO: 85 FL (ref 79–97)
MONOCYTES # BLD AUTO: 0.7 X10E3/UL (ref 0.1–0.9)
MONOCYTES NFR BLD AUTO: 9 %
NEUTROPHILS # BLD AUTO: 3.9 X10E3/UL (ref 1.4–7)
NEUTROPHILS NFR BLD AUTO: 56 %
PLATELET # BLD AUTO: 220 X10E3/UL (ref 150–450)
POTASSIUM SERPL-SCNC: 4.2 MMOL/L (ref 3.5–5.2)
PROT SERPL-MCNC: 7.4 G/DL (ref 6–8.5)
RBC # BLD AUTO: 5.06 X10E6/UL (ref 4.14–5.8)
SODIUM SERPL-SCNC: 141 MMOL/L (ref 134–144)
TESTOST SERPL-MCNC: 578 NG/DL (ref 264–916)
TRIGL SERPL-MCNC: 170 MG/DL (ref 0–149)
TSH SERPL DL<=0.005 MIU/L-ACNC: 1.14 UIU/ML (ref 0.45–4.5)
URATE SERPL-MCNC: 5.8 MG/DL (ref 3.8–8.4)
VLDLC SERPL CALC-MCNC: 29 MG/DL (ref 5–40)
WBC # BLD AUTO: 7.1 X10E3/UL (ref 3.4–10.8)

## 2024-08-08 ENCOUNTER — TELEPHONE (OUTPATIENT)
Dept: FAMILY MEDICINE | Facility: CLINIC | Age: 44
End: 2024-08-08
Payer: COMMERCIAL

## 2024-08-26 ENCOUNTER — TELEPHONE (OUTPATIENT)
Dept: FAMILY MEDICINE | Facility: CLINIC | Age: 44
End: 2024-08-26
Payer: COMMERCIAL

## 2024-08-26 DIAGNOSIS — Z87.39 HISTORY OF GOUT: ICD-10-CM

## 2024-08-26 DIAGNOSIS — E11.9 TYPE 2 DIABETES MELLITUS WITHOUT COMPLICATION, UNSPECIFIED WHETHER LONG TERM INSULIN USE: ICD-10-CM

## 2024-08-26 DIAGNOSIS — M62.838 MUSCLE SPASM: ICD-10-CM

## 2024-08-26 DIAGNOSIS — I10 HYPERTENSION, UNSPECIFIED TYPE: ICD-10-CM

## 2024-08-26 DIAGNOSIS — N52.9 ERECTILE DYSFUNCTION, UNSPECIFIED ERECTILE DYSFUNCTION TYPE: ICD-10-CM

## 2024-08-26 RX ORDER — TADALAFIL 10 MG/1
10 TABLET ORAL DAILY PRN
Qty: 60 TABLET | Refills: 1 | Status: SHIPPED | OUTPATIENT
Start: 2024-08-26

## 2024-08-26 RX ORDER — METFORMIN HYDROCHLORIDE 500 MG/1
1000 TABLET, EXTENDED RELEASE ORAL 2 TIMES DAILY WITH MEALS
Qty: 360 TABLET | Refills: 0 | Status: SHIPPED | OUTPATIENT
Start: 2024-08-26

## 2024-08-26 RX ORDER — ALLOPURINOL 300 MG/1
300 TABLET ORAL DAILY
Qty: 30 TABLET | Refills: 5 | Status: SHIPPED | OUTPATIENT
Start: 2024-08-26

## 2024-08-26 RX ORDER — TIZANIDINE 4 MG/1
4 TABLET ORAL DAILY PRN
Qty: 21 TABLET | Refills: 0 | Status: SHIPPED | OUTPATIENT
Start: 2024-08-26

## 2024-08-26 RX ORDER — LOSARTAN POTASSIUM 50 MG/1
50 TABLET ORAL DAILY
Qty: 90 TABLET | Refills: 2 | Status: SHIPPED | OUTPATIENT
Start: 2024-08-26

## 2024-08-26 NOTE — TELEPHONE ENCOUNTER
----- Message from Clementina Schumacher sent at 8/26/2024  3:47 PM CDT -----  Regarding: Med refill  Needs a refill on:  Munjaro 7.5  (was his dose raised)  Metformin 500mg  Allopurinol  300mg  Tizanidine  4mg  Losartan  50mg       These get sent to [facility] on [address] in [place]    Needs a refill on Tadalafil  10mg   [facility] on [address]    Please call back so he knows the status of his med refills.  He states he called recently and the pharmacy did not get the request

## 2024-08-29 ENCOUNTER — TELEPHONE (OUTPATIENT)
Dept: FAMILY MEDICINE | Facility: CLINIC | Age: 44
End: 2024-08-29
Payer: COMMERCIAL

## 2024-08-29 DIAGNOSIS — E78.2 MIXED HYPERLIPIDEMIA: Primary | ICD-10-CM

## 2024-08-29 RX ORDER — BEMPEDOIC ACID AND EZETIMIBE 180; 10 MG/1; MG/1
1 TABLET, FILM COATED ORAL NIGHTLY
COMMUNITY
End: 2024-08-29 | Stop reason: SDUPTHER

## 2024-08-29 RX ORDER — BEMPEDOIC ACID AND EZETIMIBE 180; 10 MG/1; MG/1
1 TABLET, FILM COATED ORAL NIGHTLY
Qty: 90 TABLET | Refills: 1 | Status: SHIPPED | OUTPATIENT
Start: 2024-08-29 | End: 2025-02-25

## 2024-08-29 NOTE — TELEPHONE ENCOUNTER
----- Message from Shellie Metz sent at 8/29/2024  7:55 AM CDT -----  Patient needs refill on Nexelet called in to [facility]

## 2024-09-04 ENCOUNTER — OFFICE VISIT (OUTPATIENT)
Dept: FAMILY MEDICINE | Facility: CLINIC | Age: 44
End: 2024-09-04
Payer: COMMERCIAL

## 2024-09-04 DIAGNOSIS — I10 PRIMARY HYPERTENSION: ICD-10-CM

## 2024-09-04 DIAGNOSIS — Z87.39 HISTORY OF GOUT: ICD-10-CM

## 2024-09-04 DIAGNOSIS — E78.5 HYPERLIPIDEMIA LDL GOAL <70: ICD-10-CM

## 2024-09-04 DIAGNOSIS — M62.838 MUSCLE SPASM: ICD-10-CM

## 2024-09-04 DIAGNOSIS — N52.9 ERECTILE DYSFUNCTION, UNSPECIFIED ERECTILE DYSFUNCTION TYPE: ICD-10-CM

## 2024-09-04 DIAGNOSIS — E11.65 TYPE 2 DIABETES MELLITUS WITH HYPERGLYCEMIA, WITHOUT LONG-TERM CURRENT USE OF INSULIN: Primary | ICD-10-CM

## 2024-09-04 RX ORDER — TADALAFIL 10 MG/1
10 TABLET ORAL DAILY PRN
Qty: 60 TABLET | Refills: 1 | Status: SHIPPED | OUTPATIENT
Start: 2024-09-04

## 2024-09-04 RX ORDER — TIZANIDINE 4 MG/1
4 TABLET ORAL DAILY PRN
Qty: 63 TABLET | Refills: 0 | Status: SHIPPED | OUTPATIENT
Start: 2024-09-04

## 2024-09-04 RX ORDER — LOSARTAN POTASSIUM 50 MG/1
50 TABLET ORAL DAILY
Qty: 90 TABLET | Refills: 2 | Status: SHIPPED | OUTPATIENT
Start: 2024-09-04

## 2024-09-04 RX ORDER — ALLOPURINOL 300 MG/1
300 TABLET ORAL DAILY
Qty: 90 TABLET | Refills: 1 | Status: SHIPPED | OUTPATIENT
Start: 2024-09-04

## 2024-09-04 RX ORDER — METFORMIN HYDROCHLORIDE 500 MG/1
1000 TABLET, EXTENDED RELEASE ORAL 2 TIMES DAILY WITH MEALS
Qty: 360 TABLET | Refills: 1 | Status: SHIPPED | OUTPATIENT
Start: 2024-09-04

## 2024-09-04 RX ORDER — BEMPEDOIC ACID AND EZETIMIBE 180; 10 MG/1; MG/1
1 TABLET, FILM COATED ORAL NIGHTLY
Qty: 90 TABLET | Refills: 1 | Status: SHIPPED | OUTPATIENT
Start: 2024-09-04 | End: 2025-03-03

## 2024-09-04 NOTE — ASSESSMENT & PLAN NOTE
Zyloprim 300 mg daily. Low purine diet. Will notify with results of lab draw when available. Continue current treatment until results available.

## 2024-09-04 NOTE — ASSESSMENT & PLAN NOTE
Has been without the tirzepatide 7.5 mg for one month due to pharmacy delay. Tolerating now without GI disturbance, will increase to mounjaro 10 mg weekly. Metformin 1000 bid with meals.   Hemoglobin A1C   Date Value Ref Range Status   08/02/2022 5.5 4.0 - 6.0 % Final     Hemoglobin A1c   Date Value Ref Range Status   08/05/2024 5.2 4.8 - 5.6 % Final     Comment:              Prediabetes: 5.7 - 6.4           Diabetes: >6.4           Glycemic control for adults with diabetes: <7.0     04/14/2024 5.8 (H) 4.8 - 5.6 % Final     Comment:              Prediabetes: 5.7 - 6.4           Diabetes: >6.4           Glycemic control for adults with diabetes: <7.0     08/08/2023 5.4 4.8 - 5.6 % Final     Comment:              Prediabetes: 5.7 - 6.4           Diabetes: >6.4           Glycemic control for adults with diabetes: <7.0     04/14/2022 5.8 4.8 - 5.6     Recheck tolerance to medication in 2 months.

## 2024-09-04 NOTE — ASSESSMENT & PLAN NOTE
Nexlizet 180/10 daily, tolerating without side effects. The current medical regimen is effective;  continue present plan and medications.

## 2024-09-04 NOTE — PROGRESS NOTES
The following appointment was scheduled and conducted using telemedicine services.   Start time:4:36  The patient location is: [place], [place], [place]  End time:  4:55  Total time spent with patient: 19  Each patient to whom he or she provides medical services by telemedicine is:  (1) informed of the relationship between the physician and patient and the respective role of any other health care provider with respect to management of the patient; and (2) notified that he or she may decline to receive medical services by telemedicine and may withdraw from such care at any time.    Subjective:       Patient ID: Jose Valadez is a 44 y.o. male.    Chief Complaint: Diabetes    Glucose monitor too expensive $75 month. Taking mounjaro at 7.5 weekly.current weight 288# during visit. [facility] [place].     Diabetes  He presents for his follow-up diabetic visit. He has type 2 diabetes mellitus. Pertinent negatives for hypoglycemia include no confusion or headaches. Pertinent negatives for diabetes include no chest pain, no polydipsia, no polyuria and no weakness.     Review of Systems   Constitutional:  Negative for activity change and unexpected weight change.   HENT:  Negative for hearing loss, rhinorrhea and trouble swallowing.    Eyes:  Negative for discharge and visual disturbance.   Respiratory:  Negative for chest tightness and wheezing.    Cardiovascular:  Negative for chest pain and palpitations.   Gastrointestinal:  Negative for blood in stool, constipation, diarrhea and vomiting.   Endocrine: Negative for polydipsia and polyuria.   Genitourinary:  Positive for erectile dysfunction (resolved with medication). Negative for difficulty urinating, hematuria and urgency.   Musculoskeletal:  Positive for neck pain. Negative for arthralgias and joint swelling.   Neurological:  Negative for weakness and headaches.   Psychiatric/Behavioral:  Negative for confusion and dysphoric mood.          Objective:      Physical  Exam  Constitutional:       Appearance: He is obese.   Neurological:      Mental Status: He is alert and oriented to person, place, and time.   Psychiatric:         Mood and Affect: Mood normal.         Behavior: Behavior normal.         Thought Content: Thought content normal.        The patient consult was performed using video-face-to face telemedicine services. The patient is without apparent distress during the call.   Assessment/Plan:       1. Type 2 diabetes mellitus with hyperglycemia, without long-term current use of insulin  Assessment & Plan:  Has been without the tirzepatide 7.5 mg for one month due to pharmacy delay. Tolerating now without GI disturbance, will increase to mounjaro 10 mg weekly. Metformin 1000 bid with meals.   Hemoglobin A1C   Date Value Ref Range Status   08/02/2022 5.5 4.0 - 6.0 % Final     Hemoglobin A1c   Date Value Ref Range Status   08/05/2024 5.2 4.8 - 5.6 % Final     Comment:              Prediabetes: 5.7 - 6.4           Diabetes: >6.4           Glycemic control for adults with diabetes: <7.0     04/14/2024 5.8 (H) 4.8 - 5.6 % Final     Comment:              Prediabetes: 5.7 - 6.4           Diabetes: >6.4           Glycemic control for adults with diabetes: <7.0     08/08/2023 5.4 4.8 - 5.6 % Final     Comment:              Prediabetes: 5.7 - 6.4           Diabetes: >6.4           Glycemic control for adults with diabetes: <7.0     04/14/2022 5.8 4.8 - 5.6     Recheck tolerance to medication in 2 months.    Orders:  -     tirzepatide 10 mg/0.5 mL PnIj; Inject 10 mg into the skin every 7 days.  Dispense: 12 Pen; Refill: 1  -     metFORMIN (GLUCOPHAGE-XR) 500 MG ER 24hr tablet; Take 2 tablets (1,000 mg total) by mouth 2 (two) times daily with meals.  Dispense: 360 tablet; Refill: 1    2. Primary hypertension  Assessment & Plan:  Losartan 50 mg dailydaily. Will notify with results of lab draw when available. Continue current treatment until results available     Orders:  -      losartan (COZAAR) 50 MG tablet; Take 1 tablet (50 mg total) by mouth once daily.  Dispense: 90 tablet; Refill: 2    3. Hyperlipidemia LDL goal <70  Assessment & Plan:  Nexlizet 180/10 daily, tolerating without side effects. The current medical regimen is effective;  continue present plan and medications.      Orders:  -     bempedoic acid-ezetimibe (NEXLIZET) 180-10 mg Tab; Take 1 tablet by mouth every evening.  Dispense: 90 tablet; Refill: 1    4. History of gout  Assessment & Plan:  Zyloprim 300 mg daily. Low purine diet. Will notify with results of lab draw when available. Continue current treatment until results available.     Orders:  -     allopurinoL (ZYLOPRIM) 300 MG tablet; Take 1 tablet (300 mg total) by mouth once daily.  Dispense: 90 tablet; Refill: 1    5. Erectile dysfunction, unspecified erectile dysfunction type  -     tadalafiL (CIALIS) 10 MG tablet; Take 1 tablet (10 mg total) by mouth daily as needed for Erectile Dysfunction.  Dispense: 60 tablet; Refill: 1    6. Muscle spasm  -     tiZANidine (ZANAFLEX) 4 MG tablet; Take 1 tablet (4 mg total) by mouth daily as needed (muscle spasm).  Dispense: 63 tablet; Refill: 0           Follow up in about 2 months (around 11/4/2024).     Discussed the diagnosis, prognosis, plan of care, chronic nature of and indications for, risks and benefits of treatment for conditions.  Continue all medications as prescribed unless otherwise noted.   Call if patient develops other symptoms or if not better in 2-3 days and sooner if worse. Emphasized the importance of compliance with all recommendations, including medication use and timely follow up. Instructed to return as noted be or sooner if patient develops any other problems or if there are any other additional questions or concerns. If referral was discussed and agreed, please contact office within 2 weeks if have not heard from referring office.

## 2024-09-04 NOTE — ASSESSMENT & PLAN NOTE
Losartan 50 mg dailydaily. Will notify with results of lab draw when available. Continue current treatment until results available

## 2024-09-18 DIAGNOSIS — I10 PRIMARY HYPERTENSION: ICD-10-CM

## 2024-09-18 DIAGNOSIS — M62.838 MUSCLE SPASM: ICD-10-CM

## 2024-09-18 DIAGNOSIS — E11.65 TYPE 2 DIABETES MELLITUS WITH HYPERGLYCEMIA, WITHOUT LONG-TERM CURRENT USE OF INSULIN: Primary | ICD-10-CM

## 2024-09-18 RX ORDER — TIRZEPATIDE 7.5 MG/.5ML
7.5 INJECTION, SOLUTION SUBCUTANEOUS
Qty: 4 PEN | Refills: 0 | Status: SHIPPED | OUTPATIENT
Start: 2024-09-18

## 2024-09-18 RX ORDER — TIZANIDINE 4 MG/1
4 TABLET ORAL EVERY 8 HOURS
Qty: 30 TABLET | Refills: 0 | Status: SHIPPED | OUTPATIENT
Start: 2024-09-18

## 2024-09-18 RX ORDER — LOSARTAN POTASSIUM 50 MG/1
50 TABLET ORAL DAILY
Qty: 90 TABLET | Refills: 0 | Status: SHIPPED | OUTPATIENT
Start: 2024-09-18

## 2024-09-19 DIAGNOSIS — E11.65 TYPE 2 DIABETES MELLITUS WITH HYPERGLYCEMIA, WITHOUT LONG-TERM CURRENT USE OF INSULIN: ICD-10-CM

## 2024-09-19 DIAGNOSIS — Z87.39 HISTORY OF GOUT: ICD-10-CM

## 2024-09-19 RX ORDER — METFORMIN HYDROCHLORIDE 500 MG/1
500 TABLET, EXTENDED RELEASE ORAL 2 TIMES DAILY WITH MEALS
Qty: 90 TABLET | Refills: 1 | Status: SHIPPED | OUTPATIENT
Start: 2024-09-19

## 2024-09-19 RX ORDER — TIRZEPATIDE 5 MG/.5ML
10 INJECTION, SOLUTION SUBCUTANEOUS WEEKLY
Qty: 12 PEN | Refills: 1 | Status: SHIPPED | OUTPATIENT
Start: 2024-09-19 | End: 2024-12-12

## 2024-09-19 RX ORDER — ALLOPURINOL 300 MG/1
300 TABLET ORAL DAILY
Qty: 90 TABLET | Refills: 1 | Status: SHIPPED | OUTPATIENT
Start: 2024-09-19

## 2024-10-02 ENCOUNTER — DOCUMENTATION ONLY (OUTPATIENT)
Dept: FAMILY MEDICINE | Facility: CLINIC | Age: 44
End: 2024-10-02
Payer: COMMERCIAL

## 2024-10-02 LAB
LEFT EYE DM RETINOPATHY: NEGATIVE
RIGHT EYE DM RETINOPATHY: NEGATIVE

## 2024-11-25 ENCOUNTER — TELEPHONE (OUTPATIENT)
Dept: FAMILY MEDICINE | Facility: CLINIC | Age: 44
End: 2024-11-25

## 2024-11-25 ENCOUNTER — OFFICE VISIT (OUTPATIENT)
Dept: FAMILY MEDICINE | Facility: CLINIC | Age: 44
End: 2024-11-25
Payer: COMMERCIAL

## 2024-11-25 VITALS
TEMPERATURE: 97 F | BODY MASS INDEX: 35.65 KG/M2 | OXYGEN SATURATION: 97 % | HEART RATE: 87 BPM | DIASTOLIC BLOOD PRESSURE: 72 MMHG | SYSTOLIC BLOOD PRESSURE: 128 MMHG | RESPIRATION RATE: 20 BRPM | WEIGHT: 269 LBS | HEIGHT: 73 IN

## 2024-11-25 DIAGNOSIS — N52.9 ERECTILE DYSFUNCTION, UNSPECIFIED ERECTILE DYSFUNCTION TYPE: ICD-10-CM

## 2024-11-25 DIAGNOSIS — E66.812 CLASS 2 OBESITY DUE TO EXCESS CALORIES WITHOUT SERIOUS COMORBIDITY WITH BODY MASS INDEX (BMI) OF 35.0 TO 35.9 IN ADULT: ICD-10-CM

## 2024-11-25 DIAGNOSIS — E66.09 CLASS 2 OBESITY DUE TO EXCESS CALORIES WITHOUT SERIOUS COMORBIDITY WITH BODY MASS INDEX (BMI) OF 35.0 TO 35.9 IN ADULT: ICD-10-CM

## 2024-11-25 DIAGNOSIS — E29.1 HYPOGONADISM IN MALE: ICD-10-CM

## 2024-11-25 DIAGNOSIS — I10 PRIMARY HYPERTENSION: ICD-10-CM

## 2024-11-25 DIAGNOSIS — E78.5 HYPERLIPIDEMIA LDL GOAL <70: ICD-10-CM

## 2024-11-25 DIAGNOSIS — Z12.5 SCREENING FOR PROSTATE CANCER: ICD-10-CM

## 2024-11-25 DIAGNOSIS — I10 BENIGN HYPERTENSION: ICD-10-CM

## 2024-11-25 DIAGNOSIS — E11.65 TYPE 2 DIABETES MELLITUS WITH HYPERGLYCEMIA, WITHOUT LONG-TERM CURRENT USE OF INSULIN: Primary | ICD-10-CM

## 2024-11-25 DIAGNOSIS — E79.0 HYPERURICEMIA: ICD-10-CM

## 2024-11-25 DIAGNOSIS — M62.830 LUMBAR PARASPINAL MUSCLE SPASM: ICD-10-CM

## 2024-11-25 DIAGNOSIS — M62.838 MUSCLE SPASM: ICD-10-CM

## 2024-11-25 LAB — GLUCOSE SERPL-MCNC: 88 MG/DL (ref 70–110)

## 2024-11-25 PROCEDURE — 82962 GLUCOSE BLOOD TEST: CPT | Mod: ,,, | Performed by: NURSE PRACTITIONER

## 2024-11-25 PROCEDURE — 99214 OFFICE O/P EST MOD 30 MIN: CPT | Mod: ,,, | Performed by: NURSE PRACTITIONER

## 2024-11-25 RX ORDER — TADALAFIL 10 MG/1
10 TABLET ORAL DAILY PRN
Qty: 90 TABLET | Refills: 1 | Status: SHIPPED | OUTPATIENT
Start: 2024-11-25 | End: 2024-11-25 | Stop reason: SDUPTHER

## 2024-11-25 RX ORDER — BEMPEDOIC ACID AND EZETIMIBE 180; 10 MG/1; MG/1
1 TABLET, FILM COATED ORAL NIGHTLY
Qty: 90 TABLET | Refills: 1 | Status: SHIPPED | OUTPATIENT
Start: 2024-11-25 | End: 2025-05-24

## 2024-11-25 RX ORDER — LOSARTAN POTASSIUM 50 MG/1
50 TABLET ORAL DAILY
Qty: 90 TABLET | Refills: 1 | Status: SHIPPED | OUTPATIENT
Start: 2024-11-25

## 2024-11-25 RX ORDER — TIZANIDINE 4 MG/1
4 TABLET ORAL EVERY 8 HOURS PRN
Qty: 42 TABLET | Refills: 1 | Status: SHIPPED | OUTPATIENT
Start: 2024-11-25

## 2024-11-25 RX ORDER — METFORMIN HYDROCHLORIDE 500 MG/1
1000 TABLET, EXTENDED RELEASE ORAL 2 TIMES DAILY WITH MEALS
Qty: 270 TABLET | Refills: 1 | Status: SHIPPED | OUTPATIENT
Start: 2024-11-25

## 2024-11-25 RX ORDER — TADALAFIL 10 MG/1
10 TABLET ORAL DAILY PRN
Qty: 90 TABLET | Refills: 1 | Status: SHIPPED | OUTPATIENT
Start: 2024-11-25

## 2024-11-25 NOTE — ASSESSMENT & PLAN NOTE
Zyloprim 300 mg daily Will notify with results of lab draw when available. Continue current treatment until results available.

## 2024-11-25 NOTE — ASSESSMENT & PLAN NOTE
Nexlizet 180/10 mg. Will notify with results of lab draw when available. Continue current treatment until results available.

## 2024-11-25 NOTE — PROGRESS NOTES
Patient ID: Jose Valadez  : 1980     Chief Complaint: Follow-up (On mounjaro)    Allergies: Patient has No Known Allergies.     History of Present Illness:  The patient is a 44 y.o. White male who presents to clinic for evaluation and management with a chief complaint of Follow-up (On mounjaro)   HPI   Patient in clinic with a 2 month follow-up on mounjaro. He is currently at 10 mg. Wanting to increase 12.5 mg. Patient states that he did well on 10 mg. Weight loss at standstill. Feeling more energy and lost additional 20#. Had slight arthritis in right foot but no true gouty attack. No nausea.     Past Medical History:  has a past medical history of Allergy, H/O: gout, Hyperlipidemia, and Low back pain.    Social History:  reports that he has quit smoking. His smoking use included cigarettes. He uses smokeless tobacco. He reports that he does not currently use alcohol. He reports that he does not use drugs.    Care Team: Patient Care Team:  Gladys Chavez DNP as PCP - General (Family Medicine)  No, Primary Doctor     Current Medications:  Current Outpatient Medications   Medication Instructions    allopurinoL (ZYLOPRIM) 300 mg, Oral, Daily    bempedoic acid-ezetimibe (NEXLIZET) 180-10 mg Tab 1 tablet, Oral, Nightly    losartan (COZAAR) 50 mg, Oral, Daily    metFORMIN (GLUCOPHAGE-XR) 500 mg, Oral, 2 times daily with meals    tadalafiL (CIALIS) 10 mg, Oral, Daily PRN    tiZANidine (ZANAFLEX) 4 mg, Oral, Every 8 hours       Review of Systems   Constitutional:  Positive for night sweats. Negative for activity change, fatigue and unexpected weight change.   HENT:  Negative for hearing loss, rhinorrhea and trouble swallowing.    Eyes:  Negative for discharge and visual disturbance.   Respiratory:  Negative for chest tightness and wheezing.    Cardiovascular:  Negative for chest pain and palpitations.   Gastrointestinal:  Negative for blood in stool, constipation, diarrhea and vomiting.   Endocrine: Negative for  "polydipsia and polyuria.   Genitourinary:  Negative for difficulty urinating, hematuria and urgency.   Musculoskeletal:  Negative for arthralgias, joint swelling and neck pain.   Integumentary:  Negative.   Allergic/Immunologic: Positive for environmental allergies.   Neurological:  Negative for weakness and headaches.   Hematological: Negative.    Psychiatric/Behavioral:  Positive for sleep disturbance (restless legs medication does help when needed.). Negative for confusion and dysphoric mood.         Visit Vitals  /72 (BP Location: Left arm, Patient Position: Sitting)   Pulse 87   Temp 97.4 °F (36.3 °C)   Resp 20   Ht 6' 1" (1.854 m)   Wt 122 kg (269 lb)   SpO2 97%   BMI 35.49 kg/m²       Physical Exam  Vitals and nursing note reviewed.   Constitutional:       General: He is not in acute distress.     Appearance: He is not ill-appearing.   HENT:      Head: Normocephalic and atraumatic.      Nose: Rhinorrhea present.      Mouth/Throat:      Mouth: Mucous membranes are moist.      Pharynx: Oropharynx is clear. Posterior oropharyngeal erythema present.   Eyes:      General: No scleral icterus.     Extraocular Movements: Extraocular movements intact.      Conjunctiva/sclera: Conjunctivae normal.      Pupils: Pupils are equal, round, and reactive to light.   Neck:      Vascular: No carotid bruit.   Cardiovascular:      Rate and Rhythm: Normal rate and regular rhythm.      Pulses: Normal pulses.      Heart sounds: Normal heart sounds. No murmur heard.     No friction rub. No gallop.   Pulmonary:      Effort: Pulmonary effort is normal. No respiratory distress.      Breath sounds: Normal breath sounds. No wheezing, rhonchi or rales.   Abdominal:      General: Abdomen is flat. Bowel sounds are normal. There is no distension.      Palpations: Abdomen is soft. There is no mass.      Tenderness: There is no abdominal tenderness.   Musculoskeletal:         General: Normal range of motion.      Cervical back: Normal " range of motion and neck supple.   Skin:     General: Skin is warm and dry.   Neurological:      General: No focal deficit present.      Mental Status: He is alert and oriented to person, place, and time.   Psychiatric:         Mood and Affect: Mood normal.         Behavior: Behavior normal.          Labs Reviewed:  Chemistry:  Lab Results   Component Value Date     08/05/2024    K 4.2 08/05/2024    BUN 14 08/05/2024    CREATININE 1.02 08/05/2024    EGFRNORACEVR 93 08/05/2024    CALCIUM 9.7 08/05/2024    ALBUMIN 4.8 08/05/2024    AST 40 08/05/2024    ALT 48 (H) 08/05/2024    GBZPBUEW72ME 35.9 08/05/2024    TSH 1.140 08/05/2024        Lab Results   Component Value Date    HGBA1C 5.2 08/05/2024        Hematology:  Lab Results   Component Value Date    WBC 7.1 08/05/2024    RBC 5.06 08/05/2024    HGB 14.4 08/05/2024    HCT 43.0 08/05/2024    MCV 85 08/05/2024    MCH 28.5 08/05/2024    MCHC 33.5 08/05/2024    RDW 13.7 08/05/2024     08/05/2024    MONO 9 08/05/2024    MONO 0.7 08/05/2024    BASO 0.1 08/05/2024    EOSINOPHIL 2 08/05/2024    BASOPHIL 1 08/05/2024       Lipid Panel:  Lab Results   Component Value Date    CHOL 90 (L) 08/05/2024    HDL 24 (L) 08/05/2024    LDLCALC 37 08/05/2024    TRIG 170 (H) 08/05/2024    TOTALCHOLEST 5.5 04/14/2022        Assessment & Plan:  1. Type 2 diabetes mellitus with hyperglycemia, without long-term current use of insulin  Assessment & Plan:  Mounjaro 12.5 mg every 7 days. Metformin xr 500 mg 2 tab bid.     Orders:  -     POCT Glucose, Hand-Held Device    2. Benign hypertension  Assessment & Plan:  Losartan 50 mg daily. The current medical regimen is effective;  continue present plan and medications.        3. Hyperlipidemia LDL goal <70  Assessment & Plan:  Nexlizet 180/10 mg. Will notify with results of lab draw when available. Continue current treatment until results available.         4. Primary hypertension    5. Erectile dysfunction, unspecified erectile  dysfunction type    6. Class 2 obesity due to excess calories without serious comorbidity with body mass index (BMI) of 35.0 to 35.9 in adult  Assessment & Plan:  Increase mounjaro 12.5 every 7 day. Will notify with results of lab draw when available. Continue current treatment until results available.         7. Hyperuricemia  Assessment & Plan:  Zyloprim 300 mg daily Will notify with results of lab draw when available. Continue current treatment until results available.         8. Muscle spasm    9. Lumbar paraspinal muscle spasm  Assessment & Plan:  Zanaflex 4 mg every 8 hours prn pain. Does help spasm when needed.       10. Hypogonadism in male  Assessment & Plan:  Has been noting testosterone improving with weight loss. Finding more energic and feeling improving without medication.            Future Appointments   Date Time Provider Department Center   2/4/2025  7:30 AM NURSE, [facility] FAMILY MEDICINE [facility]       Follow up in about 3 months (around 2/25/2025). Call sooner if needed.    Gladys Chavez DNP    Lab Frequency Next Occurrence   Ambulatory referral/consult to Sleep Disorders Once 04/22/2024

## 2024-11-25 NOTE — TELEPHONE ENCOUNTER
----- Message from Clementina sent at 11/25/2024  3:12 PM CST -----  Regarding: Med issue  Wants to make sure the Tadalafil 10mg is going to the [facility] on [address] in [place] and the Makenna 12.5 goes to [facility]

## 2024-11-25 NOTE — ASSESSMENT & PLAN NOTE
Has been noting testosterone improving with weight loss. Finding more energic and feeling improving without medication.

## 2024-11-25 NOTE — ASSESSMENT & PLAN NOTE
Increase mounjaro 12.5 every 7 day. Will notify with results of lab draw when available. Continue current treatment until results available.

## 2024-11-26 ENCOUNTER — TELEPHONE (OUTPATIENT)
Dept: FAMILY MEDICINE | Facility: CLINIC | Age: 44
End: 2024-11-26
Payer: COMMERCIAL

## 2024-11-26 DIAGNOSIS — E11.65 TYPE 2 DIABETES MELLITUS WITH HYPERGLYCEMIA, WITHOUT LONG-TERM CURRENT USE OF INSULIN: ICD-10-CM

## 2024-11-26 LAB
ALBUMIN SERPL-MCNC: 4.7 G/DL (ref 4.1–5.1)
ALP SERPL-CCNC: 75 IU/L (ref 44–121)
ALT SERPL-CCNC: 43 IU/L (ref 0–44)
AST SERPL-CCNC: 51 IU/L (ref 0–40)
BASOPHILS # BLD AUTO: 0.1 X10E3/UL (ref 0–0.2)
BASOPHILS NFR BLD AUTO: 1 %
BILIRUB SERPL-MCNC: 0.5 MG/DL (ref 0–1.2)
BUN SERPL-MCNC: 11 MG/DL (ref 6–24)
BUN/CREAT SERPL: 12 (ref 9–20)
CALCIUM SERPL-MCNC: 9.8 MG/DL (ref 8.7–10.2)
CHLORIDE SERPL-SCNC: 103 MMOL/L (ref 96–106)
CHOLEST SERPL-MCNC: 99 MG/DL (ref 100–199)
CO2 SERPL-SCNC: 21 MMOL/L (ref 20–29)
CREAT SERPL-MCNC: 0.93 MG/DL (ref 0.76–1.27)
EOSINOPHIL # BLD AUTO: 0.1 X10E3/UL (ref 0–0.4)
EOSINOPHIL NFR BLD AUTO: 1 %
ERYTHROCYTE [DISTWIDTH] IN BLOOD BY AUTOMATED COUNT: 13.2 % (ref 11.6–15.4)
EST. GFR  (NO RACE VARIABLE): 104 ML/MIN/1.73
GLOBULIN SER CALC-MCNC: 2.3 G/DL (ref 1.5–4.5)
GLUCOSE SERPL-MCNC: 96 MG/DL (ref 70–99)
HBA1C MFR BLD: 5.3 % (ref 4.8–5.6)
HCT VFR BLD AUTO: 42.6 % (ref 37.5–51)
HDLC SERPL-MCNC: 25 MG/DL
HGB BLD-MCNC: 14.2 G/DL (ref 13–17.7)
IMM GRANULOCYTES NFR BLD AUTO: 1 %
LDLC SERPL CALC-MCNC: 52 MG/DL (ref 0–99)
LYMPHOCYTES # BLD AUTO: 2.2 X10E3/UL (ref 0.7–3.1)
LYMPHOCYTES NFR BLD AUTO: 30 %
MCH RBC QN AUTO: 28.6 PG (ref 26.6–33)
MCHC RBC AUTO-ENTMCNC: 33.3 G/DL (ref 31.5–35.7)
MCV RBC AUTO: 86 FL (ref 79–97)
MONOCYTES # BLD AUTO: 0.7 X10E3/UL (ref 0.1–0.9)
MONOCYTES NFR BLD AUTO: 9 %
NEUTROPHILS # BLD AUTO: 4.2 X10E3/UL (ref 1.4–7)
NEUTROPHILS NFR BLD AUTO: 58 %
PLATELET # BLD AUTO: 262 X10E3/UL (ref 150–450)
POTASSIUM SERPL-SCNC: 4.3 MMOL/L (ref 3.5–5.2)
PROT SERPL-MCNC: 7 G/DL (ref 6–8.5)
PSA SERPL-MCNC: 0.4 NG/ML (ref 0–4)
RBC # BLD AUTO: 4.97 X10E6/UL (ref 4.14–5.8)
SODIUM SERPL-SCNC: 139 MMOL/L (ref 134–144)
TESTOST SERPL-MCNC: 578 NG/DL (ref 264–916)
TRIGL SERPL-MCNC: 119 MG/DL (ref 0–149)
URATE SERPL-MCNC: 6 MG/DL (ref 3.8–8.4)
VLDLC SERPL CALC-MCNC: 22 MG/DL (ref 5–40)
WBC # BLD AUTO: 7.3 X10E3/UL (ref 3.4–10.8)

## 2024-11-26 NOTE — TELEPHONE ENCOUNTER
----- Message from Gladys Chavez DNP sent at 11/26/2024 11:58 AM CST -----  Notify continuing to improve lipid panel, glucose control, continue current medication ask if needs refills. Recheck cmp, flp, uric acid, hgb A1c in 6 months. Testosterone remaining excellent, normal cbc, slight elevation of uric acid with dietary changes. Continue allopurinol 300, low purine diet, metformin, nexlizet with mounjaro.

## 2024-11-26 NOTE — TELEPHONE ENCOUNTER
----- Message from Shellie sent at 11/26/2024  9:26 AM CST -----  Patient called stating his insurance denied Monjaro because it was not sent in for 90 days.  Please resend a 90 day script

## 2024-12-22 DIAGNOSIS — E11.65 TYPE 2 DIABETES MELLITUS WITH HYPERGLYCEMIA, WITHOUT LONG-TERM CURRENT USE OF INSULIN: ICD-10-CM

## 2024-12-23 ENCOUNTER — TELEPHONE (OUTPATIENT)
Dept: FAMILY MEDICINE | Facility: CLINIC | Age: 44
End: 2024-12-23
Payer: COMMERCIAL

## 2024-12-23 DIAGNOSIS — J11.1 FLU: Primary | ICD-10-CM

## 2024-12-23 RX ORDER — OSELTAMIVIR PHOSPHATE 75 MG/1
75 CAPSULE ORAL 2 TIMES DAILY
Qty: 10 CAPSULE | Refills: 0 | Status: SHIPPED | OUTPATIENT
Start: 2024-12-23 | End: 2024-12-28

## 2024-12-23 RX ORDER — METFORMIN HYDROCHLORIDE 500 MG/1
1000 TABLET, EXTENDED RELEASE ORAL 2 TIMES DAILY WITH MEALS
Qty: 360 TABLET | Refills: 0 | Status: SHIPPED | OUTPATIENT
Start: 2024-12-23

## 2024-12-23 NOTE — TELEPHONE ENCOUNTER
----- Message from Clementina sent at 12/23/2024  7:46 AM CST -----  Regarding: Call out med  Kids have the flu (per [facility]) and he would like Tamiflu called out in case he starts to get sick (already has a head ache)  [facility]

## 2025-01-29 ENCOUNTER — TELEPHONE (OUTPATIENT)
Dept: FAMILY MEDICINE | Facility: CLINIC | Age: 45
End: 2025-01-29
Payer: COMMERCIAL

## 2025-01-29 DIAGNOSIS — E11.65 TYPE 2 DIABETES MELLITUS WITH HYPERGLYCEMIA, WITHOUT LONG-TERM CURRENT USE OF INSULIN: ICD-10-CM

## 2025-01-29 DIAGNOSIS — E11.65 TYPE 2 DIABETES MELLITUS WITH HYPERGLYCEMIA, WITHOUT LONG-TERM CURRENT USE OF INSULIN: Primary | ICD-10-CM

## 2025-01-29 NOTE — TELEPHONE ENCOUNTER
----- Message from Clementina sent at 1/29/2025  2:44 PM CST -----  Regarding: Med issue  [facility] will only pay for a 90 day supply of his Makenna.  Please resend refill request for a 90 day supply

## 2025-01-29 NOTE — TELEPHONE ENCOUNTER
----- Message from Shellie sent at 1/29/2025 11:03 AM CST -----  Patient called stating he was fixing to order his Monjaro and wanted to know if Gladys wanted him to go up on the dose.  Please call and let Patient know

## 2025-02-18 PROBLEM — Z12.12 SCREENING FOR COLORECTAL CANCER: Status: ACTIVE | Noted: 2025-02-18

## 2025-02-18 PROBLEM — Z12.11 SCREENING FOR COLORECTAL CANCER: Status: ACTIVE | Noted: 2025-02-18

## 2025-02-24 ENCOUNTER — OFFICE VISIT (OUTPATIENT)
Dept: FAMILY MEDICINE | Facility: CLINIC | Age: 45
End: 2025-02-24
Payer: COMMERCIAL

## 2025-02-24 DIAGNOSIS — Z87.39 HISTORY OF GOUT: ICD-10-CM

## 2025-02-24 DIAGNOSIS — I10 BENIGN HYPERTENSION: Primary | ICD-10-CM

## 2025-02-24 DIAGNOSIS — E78.5 HYPERLIPIDEMIA LDL GOAL <70: ICD-10-CM

## 2025-02-24 DIAGNOSIS — E79.0 HYPERURICEMIA: ICD-10-CM

## 2025-02-24 DIAGNOSIS — E11.65 TYPE 2 DIABETES MELLITUS WITH HYPERGLYCEMIA, WITHOUT LONG-TERM CURRENT USE OF INSULIN: ICD-10-CM

## 2025-02-24 DIAGNOSIS — I10 PRIMARY HYPERTENSION: ICD-10-CM

## 2025-02-24 DIAGNOSIS — M62.830 LUMBAR PARASPINAL MUSCLE SPASM: ICD-10-CM

## 2025-02-24 PROCEDURE — 98006 SYNCH AUDIO-VIDEO EST MOD 30: CPT | Mod: 95,,, | Performed by: NURSE PRACTITIONER

## 2025-02-24 RX ORDER — LOSARTAN POTASSIUM 25 MG/1
25 TABLET ORAL DAILY
Qty: 90 TABLET | Refills: 1 | Status: SHIPPED | OUTPATIENT
Start: 2025-02-24

## 2025-02-24 RX ORDER — METFORMIN HYDROCHLORIDE 500 MG/1
1000 TABLET, EXTENDED RELEASE ORAL 2 TIMES DAILY WITH MEALS
Qty: 360 TABLET | Refills: 1 | Status: SHIPPED | OUTPATIENT
Start: 2025-02-24

## 2025-02-24 RX ORDER — ALLOPURINOL 300 MG/1
300 TABLET ORAL DAILY
Qty: 90 TABLET | Refills: 1 | Status: SHIPPED | OUTPATIENT
Start: 2025-02-24

## 2025-02-24 RX ORDER — BEMPEDOIC ACID AND EZETIMIBE 180; 10 MG/1; MG/1
1 TABLET, FILM COATED ORAL NIGHTLY
Qty: 90 TABLET | Refills: 1 | Status: SHIPPED | OUTPATIENT
Start: 2025-02-24 | End: 2025-08-23

## 2025-02-24 NOTE — PROGRESS NOTES
The following appointment was scheduled and conducted using telemedicine services.   Start time:11:10  The patient location is: [facility]  End time:  11:28  Total time spent with patient: 18  Each patient to whom he or she provides medical services by telemedicine is:  (1) informed of the relationship between the physician and patient and the respective role of any other health care provider with respect to management of the patient; and (2) notified that he or she may decline to receive medical services by telemedicine and may withdraw from such care at any time.    Subjective:       Patient ID: Jose Valadez is a 45 y.o. male.    Chief Complaint: Follow-up (3 month follow-up mounjaro/Needing refills on all medications. Must be 3 month supply)    Follow-up  Pertinent negatives include no arthralgias, chest pain, headaches, joint swelling, neck pain, vomiting or weakness.   Tolerating well except when overeating rarely.  with 6# crawfish two days ago with no reflux. Now weight 250 naked 260# with clothes. Feeling resting better and not able to afford CPAP. Resting better. Blood sugar not checked. Feeling slightly sluggish since weight loss. Still with back and neck pain and muscle spasm. Taking zanaflex for neck muscle spasm relief 3-4 x week and does find helping symptoms but willing to see chiropractor. .   Review of Systems   Constitutional:  Negative for activity change and unexpected weight change.   HENT:  Negative for hearing loss, rhinorrhea and trouble swallowing.    Eyes:  Negative for discharge and visual disturbance.   Respiratory:  Negative for chest tightness and wheezing.    Cardiovascular:  Negative for chest pain and palpitations.   Gastrointestinal:  Negative for blood in stool, constipation, diarrhea and vomiting.   Endocrine: Negative for polydipsia and polyuria.   Genitourinary:  Negative for difficulty urinating, hematuria and urgency.   Musculoskeletal:  Negative for arthralgias, joint  swelling and neck pain.   Neurological:  Negative for weakness and headaches.   Psychiatric/Behavioral:  Negative for confusion and dysphoric mood.          Objective:      Physical Exam  Constitutional:       General: He is not in acute distress.     Appearance: He is not ill-appearing or diaphoretic.   Eyes:      General: No scleral icterus.  Pulmonary:      Effort: Pulmonary effort is normal. No respiratory distress.   Neurological:      General: No focal deficit present.      Mental Status: He is alert and oriented to person, place, and time.   Psychiatric:         Mood and Affect: Mood normal.         Behavior: Behavior normal.         Thought Content: Thought content normal.         Judgment: Judgment normal.        The patient consult was performed using video-face-to face telemedicine services. The patient is without apparent distress during the call.   Assessment/Plan:       1. Benign hypertension  Assessment & Plan:  Feeling sluggish, 1/2 losartan and check blood pressure. Weight loss feeling more tired. Check blood pressure when returning with goal <130/80.       2. Hyperuricemia  Assessment & Plan:  Zyloprim 300 mg daily Will notify with results of lab draw when available. Continue current treatment until results available.         3. Lumbar paraspinal muscle spasm  Assessment & Plan:  Zanaflex 4 mg every 8 hours prn pain. Does help spasm when needed. Discussed seeing chiropractor when returning. [facility] for chiropractor. Will schedule appointment in 3 weeks.       4. History of gout  -     allopurinoL (ZYLOPRIM) 300 MG tablet; Take 1 tablet (300 mg total) by mouth once daily.  Dispense: 90 tablet; Refill: 1    5. Hyperlipidemia LDL goal <70  -     bempedoic acid-ezetimibe (NEXLIZET) 180-10 mg Tab; Take 1 tablet by mouth every evening.  Dispense: 90 tablet; Refill: 1    6. Primary hypertension  -     losartan (COZAAR) 25 MG tablet; Take 1 tablet (25 mg total) by mouth once daily.  Dispense: 90  tablet; Refill: 1    7. Type 2 diabetes mellitus with hyperglycemia, without long-term current use of insulin  -     metFORMIN (GLUCOPHAGE-XR) 500 MG ER 24hr tablet; Take 2 tablets (1,000 mg total) by mouth 2 (two) times daily with meals.  Dispense: 360 tablet; Refill: 1  -     tirzepatide 15 mg/0.5 mL PnIj; Inject 15 mg into the skin every 7 days.  Dispense: 7 mL; Refill: 1           Follow up in about 3 months (around 5/24/2025).     Discussed the diagnosis, prognosis, plan of care, chronic nature of and indications for, risks and benefits of treatment for conditions.  Continue all medications as prescribed unless otherwise noted.   Call if patient develops other symptoms or if not better in 2-3 days and sooner if worse. Emphasized the importance of compliance with all recommendations, including medication use and timely follow up. Instructed to return as noted be or sooner if patient develops any other problems or if there are any other additional questions or concerns. If referral was discussed and agreed, please contact office within 2 weeks if have not heard from referring office.

## 2025-02-24 NOTE — ASSESSMENT & PLAN NOTE
Feeling sluggish, 1/2 losartan and check blood pressure. Weight loss feeling more tired. Check blood pressure when returning with goal <130/80.

## 2025-02-24 NOTE — ASSESSMENT & PLAN NOTE
Zanaflex 4 mg every 8 hours prn pain. Does help spasm when needed. Discussed seeing chiropractor when returning. [facility] for chiropractor. Will schedule appointment in 3 weeks.

## 2025-02-25 DIAGNOSIS — E11.65 TYPE 2 DIABETES MELLITUS WITH HYPERGLYCEMIA, WITHOUT LONG-TERM CURRENT USE OF INSULIN: ICD-10-CM

## 2025-02-25 DIAGNOSIS — M62.838 MUSCLE SPASM: ICD-10-CM

## 2025-02-25 RX ORDER — TIZANIDINE 4 MG/1
4 TABLET ORAL NIGHTLY PRN
Qty: 21 TABLET | Refills: 1 | Status: SHIPPED | OUTPATIENT
Start: 2025-02-25

## 2025-03-10 ENCOUNTER — TELEPHONE (OUTPATIENT)
Dept: FAMILY MEDICINE | Facility: CLINIC | Age: 45
End: 2025-03-10
Payer: COMMERCIAL

## 2025-03-10 NOTE — TELEPHONE ENCOUNTER
Called patient and he will go back to whole pill of losartan 25mg daily and let us know what blood pressure readings are in 3 weeks.

## 2025-03-10 NOTE — TELEPHONE ENCOUNTER
----- Message from Gladys Chavez DNP sent at 3/10/2025  6:09 AM CDT -----  Regarding: RE: MICH  Notify patient need to increase to larger dose. Either could take 25 mg bid or 50 mg daily. Let me know his preference and his response in 3 weeks to higher dose for blood pressure control to order new dose. This would be good use of E visit.  ----- Message -----  From: Katty Andrew LPN  Sent: 3/6/2025   2:34 PM CDT  To: Gladys Chavez DNP  Subject: FW: MICH                                            ----- Message -----  From: Clementina Schumacher  Sent: 3/6/2025   2:31 PM CST  To: Scott Graham Staff  Subject: MICH Graham to know he has been taking half of his blood pressure meds and this morning it was 138/80.

## 2025-04-02 DIAGNOSIS — N52.9 ERECTILE DYSFUNCTION, UNSPECIFIED ERECTILE DYSFUNCTION TYPE: ICD-10-CM

## 2025-04-02 DIAGNOSIS — E11.65 TYPE 2 DIABETES MELLITUS WITH HYPERGLYCEMIA, WITHOUT LONG-TERM CURRENT USE OF INSULIN: ICD-10-CM

## 2025-04-02 DIAGNOSIS — E78.5 HYPERLIPIDEMIA LDL GOAL <70: ICD-10-CM

## 2025-04-02 RX ORDER — TADALAFIL 10 MG/1
10 TABLET ORAL DAILY PRN
Qty: 90 TABLET | Refills: 1 | Status: SHIPPED | OUTPATIENT
Start: 2025-04-02

## 2025-04-02 RX ORDER — BEMPEDOIC ACID AND EZETIMIBE 180; 10 MG/1; MG/1
1 TABLET, FILM COATED ORAL NIGHTLY
Qty: 90 TABLET | Refills: 1 | Status: SHIPPED | OUTPATIENT
Start: 2025-04-02 | End: 2025-09-29

## 2025-04-02 RX ORDER — TADALAFIL 10 MG/1
10 TABLET ORAL DAILY PRN
Qty: 90 TABLET | Refills: 1 | Status: SHIPPED | OUTPATIENT
Start: 2025-04-02 | End: 2025-04-02 | Stop reason: SDUPTHER

## 2025-04-10 ENCOUNTER — TELEPHONE (OUTPATIENT)
Dept: FAMILY MEDICINE | Facility: CLINIC | Age: 45
End: 2025-04-10
Payer: COMMERCIAL

## 2025-04-10 DIAGNOSIS — E11.65 TYPE 2 DIABETES MELLITUS WITH HYPERGLYCEMIA, WITHOUT LONG-TERM CURRENT USE OF INSULIN: ICD-10-CM

## 2025-04-10 NOTE — TELEPHONE ENCOUNTER
----- Message from Shellie sent at 4/10/2025  1:20 PM CDT -----  Patient needs his Monjaro sent to [facility] on [address].  [facility] will not deliver his Abbie

## 2025-05-14 ENCOUNTER — OFFICE VISIT (OUTPATIENT)
Dept: FAMILY MEDICINE | Facility: CLINIC | Age: 45
End: 2025-05-14
Payer: COMMERCIAL

## 2025-05-14 VITALS
OXYGEN SATURATION: 98 % | WEIGHT: 240 LBS | HEART RATE: 72 BPM | TEMPERATURE: 98 F | HEIGHT: 73 IN | SYSTOLIC BLOOD PRESSURE: 114 MMHG | BODY MASS INDEX: 31.81 KG/M2 | DIASTOLIC BLOOD PRESSURE: 70 MMHG

## 2025-05-14 DIAGNOSIS — E79.0 HYPERURICEMIA: ICD-10-CM

## 2025-05-14 DIAGNOSIS — I10 BENIGN HYPERTENSION: ICD-10-CM

## 2025-05-14 DIAGNOSIS — E66.811 CLASS 1 OBESITY DUE TO EXCESS CALORIES WITHOUT SERIOUS COMORBIDITY WITH BODY MASS INDEX (BMI) OF 31.0 TO 31.9 IN ADULT: ICD-10-CM

## 2025-05-14 DIAGNOSIS — E78.2 MIXED HYPERLIPIDEMIA: ICD-10-CM

## 2025-05-14 DIAGNOSIS — E11.65 TYPE 2 DIABETES MELLITUS WITH HYPERGLYCEMIA, WITHOUT LONG-TERM CURRENT USE OF INSULIN: Primary | ICD-10-CM

## 2025-05-14 DIAGNOSIS — E66.09 CLASS 1 OBESITY DUE TO EXCESS CALORIES WITHOUT SERIOUS COMORBIDITY WITH BODY MASS INDEX (BMI) OF 31.0 TO 31.9 IN ADULT: ICD-10-CM

## 2025-05-14 DIAGNOSIS — F17.298 OTHER TOBACCO PRODUCT NICOTINE DEPENDENCE WITH OTHER NICOTINE-INDUCED DISORDER: ICD-10-CM

## 2025-05-14 DIAGNOSIS — G47.33 OBSTRUCTIVE SLEEP APNEA SYNDROME: ICD-10-CM

## 2025-05-14 DIAGNOSIS — E29.1 HYPOGONADISM IN MALE: ICD-10-CM

## 2025-05-14 DIAGNOSIS — Z12.11 SCREENING FOR COLORECTAL CANCER: ICD-10-CM

## 2025-05-14 DIAGNOSIS — E55.9 VITAMIN D DEFICIENCY: ICD-10-CM

## 2025-05-14 DIAGNOSIS — Z12.12 SCREENING FOR COLORECTAL CANCER: ICD-10-CM

## 2025-05-14 DIAGNOSIS — E11.9 COMPREHENSIVE DIABETIC FOOT EXAMINATION, TYPE 2 DM, ENCOUNTER FOR: ICD-10-CM

## 2025-05-14 DIAGNOSIS — E78.5 HYPERLIPIDEMIA LDL GOAL <70: ICD-10-CM

## 2025-05-14 LAB — GLUCOSE SERPL-MCNC: 98 MG/DL (ref 70–110)

## 2025-05-14 RX ORDER — METFORMIN HYDROCHLORIDE 500 MG/1
500 TABLET, EXTENDED RELEASE ORAL 2 TIMES DAILY WITH MEALS
Qty: 360 TABLET | Refills: 1 | Status: SHIPPED | OUTPATIENT
Start: 2025-05-14

## 2025-05-14 RX ORDER — LOSARTAN POTASSIUM 50 MG/1
50 TABLET ORAL
COMMUNITY
Start: 2025-04-01 | End: 2025-05-14

## 2025-05-14 NOTE — PROGRESS NOTES
Patient ID: Jose Valadez  : 1980     Chief Complaint: Follow-up (3 month follow up. Patient is fasting. )    Allergies: Patient has no known allergies.     History of Present Illness:  The patient is a 45 y.o. White male who presents to clinic for evaluation and management with a chief complaint of Follow-up (3 month follow up. Patient is fasting. )   History of Present Illness    CHIEF COMPLAINT:  Patient presents today for follow up    WEIGHT MANAGEMENT:  He reports weight reduction from 269 to 240 lbs with a goal weight of 195 lbs, noting some muscle mass loss during this process.    DIET:  He typically consumes two fried eggs with a biscuit and an apple for breakfast. His diet includes raw vegetables such as carrots with ranch dressing, cabbage, pickles, and lettuce in salads. He drinks mainly water and unsweetened or half-sweetened tea, though continues to experience significant cravings for hard candy.    CURRENT MEDICATIONS:  He takes Mounjaro 15mg weekly, Metformin twice daily which he tolerates well, and Nexlizet for cholesterol management. He takes his blood pressure medication split between morning and evening doses. He also takes daily allopurinol for gout prevention without any issues at the current dose.      ROS:  General: -fever, -chills, -fatigue, -weight gain, -weight loss  Eyes: -vision changes, -redness, -discharge  ENT: -ear pain, -nasal congestion, -sore throat  Cardiovascular: -chest pain, -palpitations, -lower extremity edema  Respiratory: -cough, -shortness of breath  Gastrointestinal: -abdominal pain, +nausea, -vomiting, -diarrhea, -constipation, -blood in stool  Genitourinary: -dysuria, -hematuria, -frequency  Musculoskeletal: -joint pain, -muscle pain, +pain with movement  Skin: -rash, -lesion  Neurological: -headache, -dizziness, -numbness, -tingling  Psychiatric: -anxiety, -depression, -sleep difficulty  Endocrine: +unusual cravings  Male Genitourinary: +changed libido       "    Past Medical History:  has a past medical history of Allergy, H/O: gout, Hyperlipidemia, and Low back pain.    Social History:  reports that he has quit smoking. His smoking use included cigarettes. He uses smokeless tobacco. He reports that he does not currently use alcohol. He reports that he does not use drugs.    Care Team: Patient Care Team:  Gladys Chavez DNP as PCP - General (Family Medicine)  No, Primary Doctor     Current Medications:  Current Outpatient Medications   Medication Instructions    allopurinoL (ZYLOPRIM) 300 mg, Oral, Daily    bempedoic acid-ezetimibe (NEXLIZET) 180-10 mg Tab 1 tablet, Oral, Nightly    losartan (COZAAR) 25 mg, Oral, Daily    metFORMIN (GLUCOPHAGE-XR) 500 mg, Oral, 2 times daily with meals    tadalafiL (CIALIS) 10 mg, Oral, Daily PRN    tirzepatide 15 mg, Subcutaneous, Every 7 days    tiZANidine (ZANAFLEX) 4 mg, Oral, Nightly PRN       Visit Vitals  /70 (BP Location: Left arm, Patient Position: Sitting)   Pulse 72   Temp 97.8 °F (36.6 °C)   Ht 6' 1" (1.854 m)   Wt 108.9 kg (240 lb)   SpO2 98%   BMI 31.66 kg/m²       Physical Exam  Vitals and nursing note reviewed.   Constitutional:       General: He is not in acute distress.     Appearance: He is not ill-appearing.   HENT:      Head: Normocephalic and atraumatic.      Nose: Nose normal.      Mouth/Throat:      Mouth: Mucous membranes are moist.      Pharynx: Oropharynx is clear.   Eyes:      General: No scleral icterus.     Extraocular Movements: Extraocular movements intact.      Conjunctiva/sclera: Conjunctivae normal.      Pupils: Pupils are equal, round, and reactive to light.   Neck:      Vascular: No carotid bruit.   Cardiovascular:      Rate and Rhythm: Normal rate and regular rhythm.      Pulses: Normal pulses.      Heart sounds: Normal heart sounds. No murmur heard.     No friction rub. No gallop.   Pulmonary:      Effort: Pulmonary effort is normal. No respiratory distress.      Breath sounds: Normal breath " sounds. No wheezing, rhonchi or rales.   Abdominal:      General: Abdomen is flat. Bowel sounds are normal. There is no distension.      Palpations: Abdomen is soft. There is no mass.      Tenderness: There is no abdominal tenderness.   Musculoskeletal:         General: Normal range of motion.      Cervical back: Normal range of motion and neck supple.   Skin:     General: Skin is warm and dry.   Neurological:      General: No focal deficit present.      Mental Status: He is alert and oriented to person, place, and time.   Psychiatric:         Mood and Affect: Mood normal.         Behavior: Behavior normal.          Labs Reviewed:  Chemistry:  Lab Results   Component Value Date     11/24/2024    K 4.3 11/24/2024    BUN 11 11/24/2024    CREATININE 0.93 11/24/2024    EGFRNORACEVR 104 11/24/2024    CALCIUM 9.8 11/24/2024    ALBUMIN 4.7 11/24/2024    AST 51 (H) 11/24/2024    ALT 43 11/24/2024    PDNBRIXM29TK 35.9 08/05/2024    TSH 1.140 08/05/2024        Lab Results   Component Value Date    HGBA1C 5.3 11/24/2024        Hematology:  Lab Results   Component Value Date    WBC 7.3 11/24/2024    RBC 4.97 11/24/2024    HGB 14.2 11/24/2024    HCT 42.6 11/24/2024    MCV 86 11/24/2024    MCH 28.6 11/24/2024    MCHC 33.3 11/24/2024    RDW 13.2 11/24/2024     11/24/2024    MONO 9 11/24/2024    MONO 0.7 11/24/2024    BASO 0.1 11/24/2024    EOSINOPHIL 1 11/24/2024    BASOPHIL 1 11/24/2024       Lipid Panel:  Lab Results   Component Value Date    CHOL 99 (L) 11/24/2024    HDL 25 (L) 11/24/2024    LDLCALC 52 11/24/2024    TRIG 119 11/24/2024    TOTALCHOLEST 5.5 04/14/2022        Assessment & Plan:  1. Type 2 diabetes mellitus with hyperglycemia, without long-term current use of insulin  Assessment & Plan:  Mounjaro 15 every 7 days and feeling well with medication. Not checking home blood sugar.taking metformin 500 (2 tab) mg bid, no gi upset. Will notify with results of lab draw when available. Continue current treatment  until results available.       Orders:  -     Hemoglobin A1C  -     Microalbumin/Creatinine Ratio, Urine  -     POCT Glucose, Hand-Held Device  -     tirzepatide 15 mg/0.5 mL PnIj; Inject 15 mg into the skin every 7 days.  Dispense: 6 mL; Refill: 1  -     metFORMIN (GLUCOPHAGE-XR) 500 MG ER 24hr tablet; Take 1 tablet (500 mg total) by mouth 2 (two) times daily with meals.  Dispense: 360 tablet; Refill: 1    2. Hyperuricemia  Assessment & Plan:  Zyloprim 300 mg daily Will notify with results of lab draw when available. Continue current treatment until results available.     Orders:  -     Uric Acid    3. Mixed hyperlipidemia  -     CBC Auto Differential  -     Comprehensive Metabolic Panel  -     Lipid Panel  -     TSH    4. Vitamin D deficiency  -     Vitamin D    5. Hyperlipidemia LDL goal <70  Assessment & Plan:  Nexlizet 180/10 mg. Will notify with results of lab draw when available. Continue current treatment until results available.          6. Benign hypertension  Assessment & Plan:  Feeling losartan 25 mg daily and less sluggish with lower dose. Blood pressure better now.       7. Comprehensive diabetic foot examination, type 2 DM, encounter for  Assessment & Plan:  Protective Sensation (w/ 10 gram monofilament):  Right: Intact  Left: Intact     Visual Inspection:  Normal -  Bilateral     Pedal Pulses:   Right: Present  Left: Present     Posterior Tibialis Pulses:   Right:Present  Left: Present       8. Class 1 obesity due to excess calories without serious comorbidity with body mass index (BMI) of 31.0 to 31.9 in adult  Assessment & Plan:  Lost 30# since last check with healthier eating. Body mass index is 31.66 kg/m². The patient's BMI has been recorded in the chart. The patient has been provided educational materials regarding the benefits of attaining and maintaining a normal weight. We will continue to address and follow this issue during follow up visits.         9. Other tobacco product nicotine  dependence with other nicotine-induced disorder  Assessment & Plan:  It is very important that he quit smoking. There are various alternatives available to help with this difficult task, but first and foremost, he must make a firm commitment and decision to quit. The nature of nicotine addiction is discussed. The usefulness of behavioral therapy is discussed and suggested.  The correct use, cost and side effects of nicotine replacement therapy such as gum or patches is discussed. Bupropion and its cost (sometimes not covered fully by insurance) and side effects are reviewed. The quit rates are discussed. I recommend he not allow potential costs of treatment to deter him from using nicotine replacement therapy or bupropion, as the long term economic and health benefits are obvious. Using savita tobacco to help wean. Trying to quit.       10. Obstructive sleep apnea syndrome  Assessment & Plan:  Tested positive for sleep apnea approximately 9 years. Has been losing weight and breathing better.       11. Screening for colorectal cancer  Assessment & Plan:  Negative family history. Cologuard due.     Orders:  -     Cologuard Screening (Multitarget Stool DNA); Future; Expected date: 05/14/2025    12. Hypogonadism in male  Assessment & Plan:  Has been noting testosterone improving with weight loss. Finding more energic and feeling improving without medication. Will notify with results of lab draw when available. Continue current treatment until results available.       Orders:  -     Testosterone,Total         Assessment & Plan    E11.65 Type 2 diabetes mellitus with hyperglycemia, without long-term current use of insulin  E11.9 Comprehensive diabetic foot exam, type 2 DM, encounter for  E79.0 Hyperuricemia  E78.2 Mixed hyperlipidemia  E55.9 Vitamin D deficiency  E78.5 Hyperlipidemia LDL goal <70  I10 Benign hypertension  E66.811, E66.09, Z68.31 Class 1 obesity due to excess calories without serious comorbidity with body  mass index (BMI) of 31.0 to 31.9 in adult  F17.298 Other tobacco product nicotine dependence with other nicotine-induced disorder  G47.33 Obstructive sleep apnea syndrome    IMPRESSION:  - Assessed significant weight loss (from 269 lbs to 240 lbs) and its positive impact on overall health.  - Decreased antihypertensive medication to half dose, to be taken half in the morning and half at night due to improved BP readings and less fatigue.  - Considered reducing metformin dosage based on improved glucose control; continue low dose metformin twice daily.  - Noted improvement in sleep apnea symptoms likely due to weight loss and reduced soft tissue crowding.  - Considered potential dose reduction of allopurinol due to weight loss; continue allopurinol at current dose for gout management.    E11.65 TYPE 2 DIABETES MELLITUS WITH HYPERGLYCEMIA, WITHOUT LONG-TERM CURRENT USE OF INSULIN:  - Continue low dose metformin twice daily and Mounjaro 15 mg injection approximately every 7 days.    E79.0 HYPERURICEMIA:  - Continue allopurinol at current dose for gout management.    I10 BENIGN HYPERTENSION:  - Decreased antihypertensive medication to half dose, to be taken half in the morning and half at night due to improved BP readings and less fatigue.  - Explained pickle consumption no longer impacts BP as significantly due to improved overall health.    E66.811, E66.09, Z68.31 CLASS 1 OBESITY DUE TO EXCESS CALORIES WITHOUT SERIOUS COMORBIDITY WITH BODY MASS INDEX (BMI) OF 31.0 TO 31.9 IN ADULT:  - Patient to continue current weight loss efforts and lifestyle changes.  - Explained importance of lean protein intake to maintain muscle mass during weight loss; recommend trying ProTalilty drink for protein supplementation.          No future appointments.    Follow up in about 3 months (around 8/14/2025). Call sooner if needed.      This note was generated with the assistance of ambient listening technology. Verbal consent was obtained  by the patient and accompanying visitor(s) for the recording of patient appointment to facilitate this note. I attest to having reviewed and edited the generated note for accuracy, though some syntax or spelling errors may persist. Please contact the author of this note for any clarification.      Gladys Chavez, [facility]    Lab Frequency Next Occurrence   Ambulatory referral/consult to Sleep Disorders Once 04/22/2024

## 2025-05-14 NOTE — ASSESSMENT & PLAN NOTE
Lost 30# since last check with healthier eating. Body mass index is 31.66 kg/m². The patient's BMI has been recorded in the chart. The patient has been provided educational materials regarding the benefits of attaining and maintaining a normal weight. We will continue to address and follow this issue during follow up visits.

## 2025-05-14 NOTE — ASSESSMENT & PLAN NOTE
Mounjaro 15 every 7 days and feeling well with medication. Not checking home blood sugar.taking metformin 500 (2 tab) mg bid, no gi upset. Will notify with results of lab draw when available. Continue current treatment until results available.

## 2025-05-14 NOTE — ASSESSMENT & PLAN NOTE
Tested positive for sleep apnea approximately 9 years. Has been losing weight and breathing better.

## 2025-05-14 NOTE — ASSESSMENT & PLAN NOTE
It is very important that he quit smoking. There are various alternatives available to help with this difficult task, but first and foremost, he must make a firm commitment and decision to quit. The nature of nicotine addiction is discussed. The usefulness of behavioral therapy is discussed and suggested.  The correct use, cost and side effects of nicotine replacement therapy such as gum or patches is discussed. Bupropion and its cost (sometimes not covered fully by insurance) and side effects are reviewed. The quit rates are discussed. I recommend he not allow potential costs of treatment to deter him from using nicotine replacement therapy or bupropion, as the long term economic and health benefits are obvious. Using savita tobacco to help wean. Trying to quit.

## 2025-05-14 NOTE — ASSESSMENT & PLAN NOTE
Has been noting testosterone improving with weight loss. Finding more energic and feeling improving without medication. Will notify with results of lab draw when available. Continue current treatment until results available.

## 2025-05-15 LAB
ALBUMIN/CREAT UR: NORMAL MG/G CREAT (ref 0–29)
CREAT UR-MCNC: 247.4 MG/DL
MICROALBUMIN UR-MCNC: <12 UG/ML
TESTOST SERPL-MCNC: 734 NG/DL (ref 264–916)

## 2025-05-16 LAB
25(OH)D3+25(OH)D2 SERPL-MCNC: 41.7 NG/ML (ref 30–100)
ALBUMIN SERPL-MCNC: 5.2 G/DL (ref 4.1–5.1)
ALP SERPL-CCNC: 72 IU/L (ref 44–121)
ALT SERPL-CCNC: 19 IU/L (ref 0–44)
AST SERPL-CCNC: 22 IU/L (ref 0–40)
BASOPHILS # BLD AUTO: 0.1 X10E3/UL (ref 0–0.2)
BASOPHILS NFR BLD AUTO: 1 %
BILIRUB SERPL-MCNC: 0.6 MG/DL (ref 0–1.2)
BUN SERPL-MCNC: 12 MG/DL (ref 6–24)
BUN/CREAT SERPL: 13 (ref 9–20)
CALCIUM SERPL-MCNC: 10.2 MG/DL (ref 8.7–10.2)
CHLORIDE SERPL-SCNC: 103 MMOL/L (ref 96–106)
CHOLEST SERPL-MCNC: 93 MG/DL (ref 100–199)
CO2 SERPL-SCNC: 20 MMOL/L (ref 20–29)
CREAT SERPL-MCNC: 0.91 MG/DL (ref 0.76–1.27)
EOSINOPHIL # BLD AUTO: 0.1 X10E3/UL (ref 0–0.4)
EOSINOPHIL NFR BLD AUTO: 2 %
ERYTHROCYTE [DISTWIDTH] IN BLOOD BY AUTOMATED COUNT: 13.5 % (ref 11.6–15.4)
EST. GFR  (NO RACE VARIABLE): 106 ML/MIN/1.73
GLOBULIN SER CALC-MCNC: 2.4 G/DL (ref 1.5–4.5)
GLUCOSE SERPL-MCNC: 98 MG/DL (ref 70–99)
HBA1C MFR BLD: 5.2 % (ref 4.8–5.6)
HCT VFR BLD AUTO: 43.6 % (ref 37.5–51)
HDLC SERPL-MCNC: 30 MG/DL
HGB BLD-MCNC: 14.9 G/DL (ref 13–17.7)
IMM GRANULOCYTES NFR BLD AUTO: 1 %
LDLC SERPL CALC-MCNC: 43 MG/DL (ref 0–99)
LYMPHOCYTES # BLD AUTO: 2.2 X10E3/UL (ref 0.7–3.1)
LYMPHOCYTES NFR BLD AUTO: 34 %
MCH RBC QN AUTO: 29.8 PG (ref 26.6–33)
MCHC RBC AUTO-ENTMCNC: 34.2 G/DL (ref 31.5–35.7)
MCV RBC AUTO: 87 FL (ref 79–97)
MONOCYTES # BLD AUTO: 0.6 X10E3/UL (ref 0.1–0.9)
MONOCYTES NFR BLD AUTO: 9 %
NEUTROPHILS # BLD AUTO: 3.5 X10E3/UL (ref 1.4–7)
NEUTROPHILS NFR BLD AUTO: 53 %
PLATELET # BLD AUTO: 221 X10E3/UL (ref 150–450)
POTASSIUM SERPL-SCNC: 4.8 MMOL/L (ref 3.5–5.2)
PROT SERPL-MCNC: 7.6 G/DL (ref 6–8.5)
RBC # BLD AUTO: 5 X10E6/UL (ref 4.14–5.8)
SODIUM SERPL-SCNC: 142 MMOL/L (ref 134–144)
TRIGL SERPL-MCNC: 106 MG/DL (ref 0–149)
TSH SERPL DL<=0.005 MIU/L-ACNC: 1.2 UIU/ML (ref 0.45–4.5)
URATE SERPL-MCNC: 5.5 MG/DL (ref 3.8–8.4)
VLDLC SERPL CALC-MCNC: 20 MG/DL (ref 5–40)
WBC # BLD AUTO: 6.5 X10E3/UL (ref 3.4–10.8)

## 2025-05-20 ENCOUNTER — RESULTS FOLLOW-UP (OUTPATIENT)
Dept: FAMILY MEDICINE | Facility: CLINIC | Age: 45
End: 2025-05-20

## 2025-08-13 ENCOUNTER — OFFICE VISIT (OUTPATIENT)
Dept: FAMILY MEDICINE | Facility: CLINIC | Age: 45
End: 2025-08-13
Payer: COMMERCIAL

## 2025-08-13 DIAGNOSIS — M62.838 MUSCLE SPASM: ICD-10-CM

## 2025-08-13 DIAGNOSIS — I10 PRIMARY HYPERTENSION: ICD-10-CM

## 2025-08-13 DIAGNOSIS — I10 BENIGN HYPERTENSION: Primary | ICD-10-CM

## 2025-08-13 DIAGNOSIS — N52.9 ERECTILE DYSFUNCTION, UNSPECIFIED ERECTILE DYSFUNCTION TYPE: ICD-10-CM

## 2025-08-13 DIAGNOSIS — Z87.39 HISTORY OF GOUT: ICD-10-CM

## 2025-08-13 DIAGNOSIS — M62.830 LUMBAR PARASPINAL MUSCLE SPASM: ICD-10-CM

## 2025-08-13 DIAGNOSIS — E11.65 TYPE 2 DIABETES MELLITUS WITH HYPERGLYCEMIA, WITHOUT LONG-TERM CURRENT USE OF INSULIN: ICD-10-CM

## 2025-08-13 PROCEDURE — 98004 SYNCH AUDIO-VIDEO EST SF 10: CPT | Mod: 95,,, | Performed by: NURSE PRACTITIONER

## 2025-08-13 RX ORDER — TADALAFIL 10 MG/1
10 TABLET ORAL DAILY PRN
Qty: 90 TABLET | Refills: 1 | Status: SHIPPED | OUTPATIENT
Start: 2025-08-13

## 2025-08-13 RX ORDER — TIZANIDINE 4 MG/1
4 TABLET ORAL NIGHTLY PRN
Qty: 21 TABLET | Refills: 1 | Status: SHIPPED | OUTPATIENT
Start: 2025-08-13

## 2025-08-13 RX ORDER — LOSARTAN POTASSIUM 25 MG/1
12.5 TABLET ORAL DAILY
Qty: 45 TABLET | Refills: 1 | Status: SHIPPED | OUTPATIENT
Start: 2025-08-13

## 2025-08-13 RX ORDER — ALLOPURINOL 300 MG/1
300 TABLET ORAL DAILY
Qty: 90 TABLET | Refills: 1 | Status: SHIPPED | OUTPATIENT
Start: 2025-08-13